# Patient Record
Sex: FEMALE | Race: BLACK OR AFRICAN AMERICAN | NOT HISPANIC OR LATINO | Employment: UNEMPLOYED | URBAN - METROPOLITAN AREA
[De-identification: names, ages, dates, MRNs, and addresses within clinical notes are randomized per-mention and may not be internally consistent; named-entity substitution may affect disease eponyms.]

---

## 2024-03-28 ENCOUNTER — HOSPITAL ENCOUNTER (OUTPATIENT)
Facility: HOSPITAL | Age: 34
Discharge: HOME | End: 2024-03-28
Attending: EMERGENCY MEDICINE | Admitting: OBSTETRICS & GYNECOLOGY
Payer: MEDICAID

## 2024-03-28 VITALS
OXYGEN SATURATION: 100 % | RESPIRATION RATE: 18 BRPM | HEART RATE: 86 BPM | HEIGHT: 64 IN | WEIGHT: 110 LBS | DIASTOLIC BLOOD PRESSURE: 60 MMHG | TEMPERATURE: 98 F | SYSTOLIC BLOOD PRESSURE: 97 MMHG | BODY MASS INDEX: 18.78 KG/M2

## 2024-03-28 DIAGNOSIS — Z3A.24 24 WEEKS GESTATION OF PREGNANCY: Primary | ICD-10-CM

## 2024-03-28 DIAGNOSIS — R10.9 ABDOMINAL PAIN: ICD-10-CM

## 2024-03-28 LAB
ABO + RH BLD: NORMAL
ALBUMIN SERPL BCP-MCNC: 3 G/DL (ref 3.5–5.2)
ALP SERPL-CCNC: 78 U/L (ref 55–135)
ALT SERPL W/O P-5'-P-CCNC: 16 U/L (ref 10–44)
ANION GAP SERPL CALC-SCNC: 6 MMOL/L (ref 8–16)
AST SERPL-CCNC: 24 U/L (ref 10–40)
B-HCG UR QL: POSITIVE
BACTERIA #/AREA URNS HPF: NORMAL /HPF
BASOPHILS # BLD AUTO: 0.01 K/UL (ref 0–0.2)
BASOPHILS NFR BLD: 0.2 % (ref 0–1.9)
BILIRUB SERPL-MCNC: 0.2 MG/DL (ref 0.1–1)
BILIRUB UR QL STRIP: NEGATIVE
BUN SERPL-MCNC: 4 MG/DL (ref 6–20)
CALCIUM SERPL-MCNC: 8.7 MG/DL (ref 8.7–10.5)
CHLORIDE SERPL-SCNC: 108 MMOL/L (ref 95–110)
CLARITY UR: CLEAR
CO2 SERPL-SCNC: 21 MMOL/L (ref 23–29)
COLOR UR: COLORLESS
CREAT SERPL-MCNC: 0.5 MG/DL (ref 0.5–1.4)
CTP QC/QA: YES
DIFFERENTIAL METHOD BLD: ABNORMAL
EOSINOPHIL # BLD AUTO: 0 K/UL (ref 0–0.5)
EOSINOPHIL NFR BLD: 0.7 % (ref 0–8)
ERYTHROCYTE [DISTWIDTH] IN BLOOD BY AUTOMATED COUNT: 13.5 % (ref 11.5–14.5)
EST. GFR  (NO RACE VARIABLE): >60 ML/MIN/1.73 M^2
GLUCOSE SERPL-MCNC: 70 MG/DL (ref 70–110)
GLUCOSE UR QL STRIP: NEGATIVE
HCG INTACT+B SERPL-ACNC: NORMAL MIU/ML
HCT VFR BLD AUTO: 32.6 % (ref 37–48.5)
HGB BLD-MCNC: 10.8 G/DL (ref 12–16)
HGB UR QL STRIP: NEGATIVE
IMM GRANULOCYTES # BLD AUTO: 0.01 K/UL (ref 0–0.04)
IMM GRANULOCYTES NFR BLD AUTO: 0.2 % (ref 0–0.5)
KETONES UR QL STRIP: NEGATIVE
LEUKOCYTE ESTERASE UR QL STRIP: ABNORMAL
LIPASE SERPL-CCNC: 11 U/L (ref 4–60)
LYMPHOCYTES # BLD AUTO: 1.5 K/UL (ref 1–4.8)
LYMPHOCYTES NFR BLD: 25.2 % (ref 18–48)
MAGNESIUM SERPL-MCNC: 1.8 MG/DL (ref 1.6–2.6)
MCH RBC QN AUTO: 30.3 PG (ref 27–31)
MCHC RBC AUTO-ENTMCNC: 33.1 G/DL (ref 32–36)
MCV RBC AUTO: 92 FL (ref 82–98)
MICROSCOPIC COMMENT: NORMAL
MONOCYTES # BLD AUTO: 0.6 K/UL (ref 0.3–1)
MONOCYTES NFR BLD: 9 % (ref 4–15)
NEUTROPHILS # BLD AUTO: 4 K/UL (ref 1.8–7.7)
NEUTROPHILS NFR BLD: 64.7 % (ref 38–73)
NITRITE UR QL STRIP: NEGATIVE
NRBC BLD-RTO: 0 /100 WBC
PH UR STRIP: 8 [PH] (ref 5–8)
PLATELET # BLD AUTO: 269 K/UL (ref 150–450)
PMV BLD AUTO: 9.7 FL (ref 9.2–12.9)
POTASSIUM SERPL-SCNC: 3.7 MMOL/L (ref 3.5–5.1)
PROT SERPL-MCNC: 7.2 G/DL (ref 6–8.4)
PROT UR QL STRIP: NEGATIVE
RBC # BLD AUTO: 3.56 M/UL (ref 4–5.4)
SODIUM SERPL-SCNC: 135 MMOL/L (ref 136–145)
SP GR UR STRIP: 1.01 (ref 1–1.03)
URN SPEC COLLECT METH UR: ABNORMAL
UROBILINOGEN UR STRIP-ACNC: NEGATIVE EU/DL
WBC # BLD AUTO: 6.1 K/UL (ref 3.9–12.7)
WBC #/AREA URNS HPF: 0 /HPF (ref 0–5)

## 2024-03-28 PROCEDURE — 99285 EMERGENCY DEPT VISIT HI MDM: CPT | Mod: 25,27

## 2024-03-28 PROCEDURE — 96360 HYDRATION IV INFUSION INIT: CPT

## 2024-03-28 PROCEDURE — 81025 URINE PREGNANCY TEST: CPT | Performed by: EMERGENCY MEDICINE

## 2024-03-28 PROCEDURE — 59025 FETAL NON-STRESS TEST: CPT

## 2024-03-28 PROCEDURE — 81000 URINALYSIS NONAUTO W/SCOPE: CPT | Performed by: EMERGENCY MEDICINE

## 2024-03-28 PROCEDURE — 83735 ASSAY OF MAGNESIUM: CPT | Performed by: EMERGENCY MEDICINE

## 2024-03-28 PROCEDURE — 99284 EMERGENCY DEPT VISIT MOD MDM: CPT | Mod: 25,27

## 2024-03-28 PROCEDURE — 85025 COMPLETE CBC W/AUTO DIFF WBC: CPT | Performed by: EMERGENCY MEDICINE

## 2024-03-28 PROCEDURE — 84702 CHORIONIC GONADOTROPIN TEST: CPT | Performed by: EMERGENCY MEDICINE

## 2024-03-28 PROCEDURE — 86901 BLOOD TYPING SEROLOGIC RH(D): CPT | Performed by: EMERGENCY MEDICINE

## 2024-03-28 PROCEDURE — 99211 OFF/OP EST MAY X REQ PHY/QHP: CPT | Mod: 25

## 2024-03-28 PROCEDURE — 83690 ASSAY OF LIPASE: CPT | Performed by: EMERGENCY MEDICINE

## 2024-03-28 PROCEDURE — 80053 COMPREHEN METABOLIC PANEL: CPT | Performed by: EMERGENCY MEDICINE

## 2024-03-28 PROCEDURE — 25000003 PHARM REV CODE 250: Performed by: EMERGENCY MEDICINE

## 2024-03-28 RX ORDER — ACETAMINOPHEN 325 MG/1
650 TABLET ORAL
Status: COMPLETED | OUTPATIENT
Start: 2024-03-28 | End: 2024-03-28

## 2024-03-28 RX ADMIN — ACETAMINOPHEN 650 MG: 325 TABLET ORAL at 12:03

## 2024-03-28 RX ADMIN — SODIUM CHLORIDE 1000 ML: 9 INJECTION, SOLUTION INTRAVENOUS at 10:03

## 2024-03-28 NOTE — ED TRIAGE NOTES
Abdominal Pain (Pt c/o abdominal cramping since last night. Pt is 20 weeks 3 days pregnant. LMP 11/06/23. Pt does feel fetal movement. No prenatal care. L&D refused patient )

## 2024-03-28 NOTE — LETTER
March 28, 2024         Karsten CESPEDES  [facility]  [place] [address]  Phone: [phone]       Patient: Franko Pierson   YOB: 1990  Date of Visit: 03/28/2024    To Whom It May Concern:    Mallory Pierson  was at [facility] on 03/28/2024. The patient may return to work/school on 3/29/24 with no restrictions. If you have any questions or concerns, or if I can be of further assistance, please do not hesitate to contact me.    Sincerely,    Funmi Ramirez RN

## 2024-03-28 NOTE — ED NOTES
"0920: Call recvd from ED stating there is an unreferred Mongolian-Creole pt who is 20w3d c/o abdominal pain "all over belly" and pt feels "a lot of movement". L&D RN asked pt if she has ever seen an OB for this pregnancy, had a dating u/s, or any kind of confirmation of pregnancy, to which the pt replied "no". L&D RN states pt is to be seen in ED for generalized abdominal pain and consult OB on call. ED used  to speak to pt during this interaction.     0924: Call recvd from ED Triage RN stating "this pt needs to go to L&D, she is 20w3d and has abdominal pain". L&D RN states that pt has not seen an OB for this pregnancy, has not had confirmation of pregnancy and has not had a dating u/s to confirm pregnancy >20w. If confirmed pregnancy over 20w, pt can come to L&D for assessment of abdominal pain. Advised ED RN to contact OB on call (Dr. Ga) to discuss pt and obtain orders.      "

## 2024-03-28 NOTE — NURSING
Pt arrived to L&D unit via wheelchair. EFMx2 placed. Full assessment done. History and medications reviewed. Pt reports active fetal movement. Denies vaginal bleeding, LOF, or any problems with urination. Abdomen soft non-tender.   Pt states she did not establish PNC due to not having health insurance. Pt states she no longer has any pain and is requesting u/s to determine gender of baby. Pt states she wants to deliver at this hospital with Dr. David. Office phone # and address given to pt. Pt states she will call to make appt. Dr. Ga notified of pt arrival to L&D and pt status. FHT reactive for 24w, no ctx on toco. Orders to d/c home and establish PNC with OB. Pt and sig other updated on POC. Discharge instructions reviewed with pt and significant other including labor precautions, kick counts, pre-eclampsia signs/symptoms and when to return to hospital. Pt verbalized understanding with good recall noted. Questions answered. Pt ambulated off unit. NAD noted.

## 2024-03-28 NOTE — ED PROVIDER NOTES
Encounter Date: 3/28/2024    SCRIBE #1 NOTE: I, Ann Lopez, am scribing for, and in the presence of,  Ana Valencia MD. I have scribed the following portions of the note - Other sections scribed: HPI, ROS, PE.       History     Chief Complaint   Patient presents with    Abdominal Pain     Pt c/o abdominal cramping since last night. Pt is 20 weeks 3 days pregnant. LMP 23. Pt does feel fetal movement. No prenatal care.      Franko Pierson is a 33 y.o. female at 20 weeks gestation , with no PMHx, who presents to the ED with diffuse Abdominal cramping,  symptoms onset since last night. Patient reports last BM was this morning but does report it was a black stool x 1.  Patient states her LMP to be . Patient also states with her last pregnancy she had pain though it is not similar to her current pain; stating that she did not have any complications with her last pregnancy. Patient further states she does not follow with an OB-GYN. No other exacerbating or alleviating factors. Denies vaginal bleeding, nausea, emesis, frequency, dysuria, hematuria, back pain, or other associated symptoms. No daily medications and no prenatal vitamins. Patient denies EtOH, tobacco, or illicit drug use. Patient reports no PSHx. NKDA.     , no complications with previous pregnancy. Does not have an Obgyn, denies prenatal care. No PNV.     She presents with her brother-in-law, independent historian, who helps her with story throughout.               The history is provided by the patient. The history is limited by a language barrier. A  was used ([id number]).     Review of patient's allergies indicates:  No Known Allergies  No past medical history on file.  No past surgical history on file.  No family history on file.     Review of Systems   Constitutional:  Negative for chills, diaphoresis and fever.   Eyes:  Negative for photophobia and visual disturbance.   Respiratory:  Negative for cough and  shortness of breath.    Cardiovascular:  Negative for chest pain and leg swelling.   Gastrointestinal:  Positive for abdominal pain. Negative for blood in stool, constipation, diarrhea, nausea and vomiting.        (+) melena     Genitourinary:  Negative for dysuria, flank pain, frequency, hematuria, urgency and vaginal bleeding.   Musculoskeletal:  Negative for back pain, neck pain and neck stiffness.   Skin:  Negative for rash and wound.   Neurological:  Negative for weakness, light-headedness, numbness and headaches.   Psychiatric/Behavioral:  Negative for confusion and suicidal ideas.    All other systems reviewed and are negative.      Physical Exam     Initial Vitals [03/28/24 0930]   BP Pulse Resp Temp SpO2   108/72 83 18 98.5 °F (36.9 °C) 100 %      MAP       --         Physical Exam    Nursing note and vitals reviewed.  Constitutional: She appears well-developed and well-nourished. She is not diaphoretic. No distress.   HENT:   Head: Normocephalic and atraumatic.   Mouth/Throat: Oropharynx is clear and moist. No oropharyngeal exudate.   Eyes: Conjunctivae and EOM are normal. Pupils are equal, round, and reactive to light. Right eye exhibits no discharge. Left eye exhibits no discharge.   Neck: Neck supple. No JVD present.   Normal range of motion.  Cardiovascular:  Normal rate, regular rhythm, normal heart sounds and intact distal pulses.     Exam reveals no gallop and no friction rub.       No murmur heard.  Pulmonary/Chest: Breath sounds normal. No respiratory distress. She has no wheezes. She has no rhonchi. She has no rales.   Abdominal: Abdomen is soft. Bowel sounds are normal. She exhibits no distension. There is no abdominal tenderness. There is no rebound and no guarding.   Genitourinary:    Genitourinary Comments: Rectal Exam chaperoned by RN nurse.     Negative rectal exam, no FOBT, no hemorrhoids.      Musculoskeletal:         General: No tenderness or edema.      Cervical back: Normal range of  motion and neck supple.     Lymphadenopathy:     She has no cervical adenopathy.   Neurological: She is alert and oriented to person, place, and time. No cranial nerve deficit.   Skin: Skin is warm and dry.   Psychiatric: She has a normal mood and affect. Thought content normal.         ED Course   Procedures  Labs Reviewed   CBC W/ AUTO DIFFERENTIAL - Abnormal; Notable for the following components:       Result Value    RBC 3.56 (*)     Hemoglobin 10.8 (*)     Hematocrit 32.6 (*)     All other components within normal limits    Narrative:     Release to patient->Immediate   COMPREHENSIVE METABOLIC PANEL - Abnormal; Notable for the following components:    Sodium 135 (*)     CO2 21 (*)     BUN 4 (*)     Albumin 3.0 (*)     Anion Gap 6 (*)     All other components within normal limits    Narrative:     Release to patient->Immediate   URINALYSIS, REFLEX TO URINE CULTURE - Abnormal; Notable for the following components:    Color, UA Colorless (*)     Leukocytes, UA 2+ (*)     All other components within normal limits    Narrative:     Specimen Source->Urine   POCT URINE PREGNANCY - Abnormal; Notable for the following components:    POC Preg Test, Ur Positive (*)     All other components within normal limits   HCG, QUANTITATIVE    Narrative:     Release to patient->Immediate   LIPASE    Narrative:     Release to patient->Immediate   MAGNESIUM    Narrative:     Release to patient->Immediate   URINALYSIS MICROSCOPIC    Narrative:     Specimen Source->Urine   GROUP & RH          Imaging Results              US OB Limited 1 Or More Gestations (Final result)  Result time 03/28/24 13:42:09   Procedure changed from US OB <14 Wks, TransAbd, Single Gestation     Final result by Ivan Lehman MD (03/28/24 13:42:09)                   Impression:      Single, viable, intrauterine gestation with a combine biometric derived estimated gestational age of 24 weeks 1 day and an CHRIS of 07/17/2024.    Amniotic fluid volume is within  normal limits and there is no evidence of placenta previa.    Two uterine fibroids are identified with the larger measuring 5.1 cm in greatest dimension.      Electronically signed by: Ivan Lehman MD  Date:    03/28/2024  Time:    13:42               Narrative:    EXAMINATION:  US OB LIMITED 1 OR MORE GESTATIONS    CLINICAL HISTORY:  abd pain;  Unspecified abdominal pain    TECHNIQUE:  Transabdominal 2nd or 3rd trimester obstetrical ultrasound was performed.    COMPARISON:  None.    FINDINGS:  There is a single viable intrauterine gestation currently in breech presentation with a combined biometric derived estimated gestational age of 24 weeks 1 day +/- 1 week 5 days and an CHRIS of 07/17/2024.  Estimated fetal weight is 649 g +/- 117 g (1 lb 7 oz +/- 4 oz).  The individual biometric parameters are as follows:    BPD: 6.04 cm    Head circumference: 21.96 cm    Abdominal circumference: 19.20 cm    Amniotic fluid volume is within normal limits with an AINSLEY of 148 mm.  Fetal cardiac activity is seen with a rate of 151 beats per minute on M-mode imaging.    The cervix appears closed and there is no evidence placenta previa.  The placenta is anterior-fundal in location.  The cervix is closed and elongated.  Limited, fetal anatomic survey including ventricular atria, posterior fossa, three-vessel cord, 4 chamber heart view, stomach, cord insert, 3 vessel cord, bladder, spine, and 4 limbs demonstrate no gross abnormalities.  The adnexal regions and uterus are otherwise unremarkable.    There are uterine fibroids present including a fibroid measuring 4.5 x 4.2 x 5.1 cm and a fibroid measuring 3.8 x 3.9 x 4.0 cm.                                       Medications   sodium chloride 0.9% bolus 1,000 mL 1,000 mL (0 mLs Intravenous Stopped 3/28/24 1150)   acetaminophen tablet 650 mg (650 mg Oral Given 3/28/24 1213)     Medical Decision Making  Amount and/or Complexity of Data Reviewed  Labs: ordered.  Radiology:  ordered.    Risk  OTC drugs.    MDM  Patient presents with pregnancy and abdominal pain with beta 22K and viable pregnancy 24 W 1 D on u/s. There is no vaginal bleeding.    DDX included but not limited to: normal pregnancy, miscarriage, AUB, UTI, ectopic, PID    No fever, no RLQ pain, no vomiting. I have lower suspicion for appendicitis, intraabdominal infection, SBO, etc.     Unremarkable labs, negative urinalysis for UTI/protein/glucose  At this time, this is likely abdominal pain of pregnancy and there are no findings on exam/lab to suspect acute process. No unilateral pain/adnexal TTP and normal u/s and labs.     Mild anemia to 10.8. No leukocytosis. CO2 mildly low at 21. Na 135. O positive.     Case discussed with Dr Ga and will send patient to L&D for monitoring given abdominal pain in pregnancy with > 20 weeks, she reports will do a vaginal exam there so no need for patient to have two. Will defer vaginal exam to OBGYN. .             Scribe Attestation:   Scribe #1: I performed the above scribed service and the documentation accurately describes the services I performed. I attest to the accuracy of the note.           I, Ana Valencia, personally performed the services described in this documentation. All medical record entries made by the scribe were at my direction and in my presence.  I have reviewed the chart and agree that the record reflects my personal performance and is accurate and complete.                      Clinical Impression:  Final diagnoses:  [R10.9] Abdominal pain  [Z3A.24] 24 weeks gestation of pregnancy (Primary)          ED Disposition Condition    Observation Stable                Ana Valencia MD  03/28/24 7693

## 2024-04-22 ENCOUNTER — INITIAL PRENATAL (OUTPATIENT)
Dept: OBSTETRICS AND GYNECOLOGY | Facility: CLINIC | Age: 34
End: 2024-04-22
Payer: MEDICAID

## 2024-04-22 DIAGNOSIS — O09.523 AMA (ADVANCED MATERNAL AGE) MULTIGRAVIDA 35+, THIRD TRIMESTER: ICD-10-CM

## 2024-04-22 DIAGNOSIS — Z3A.27 27 WEEKS GESTATION OF PREGNANCY: ICD-10-CM

## 2024-04-22 DIAGNOSIS — O09.33 LATE PRENATAL CARE AFFECTING PREGNANCY IN THIRD TRIMESTER: Primary | ICD-10-CM

## 2024-04-22 PROCEDURE — 87086 URINE CULTURE/COLONY COUNT: CPT | Performed by: OBSTETRICS & GYNECOLOGY

## 2024-04-22 PROCEDURE — 99204 OFFICE O/P NEW MOD 45 MIN: CPT | Mod: TH,S$PBB,, | Performed by: OBSTETRICS & GYNECOLOGY

## 2024-04-22 PROCEDURE — 87591 N.GONORRHOEAE DNA AMP PROB: CPT | Performed by: OBSTETRICS & GYNECOLOGY

## 2024-04-22 PROCEDURE — 87491 CHLMYD TRACH DNA AMP PROBE: CPT | Performed by: OBSTETRICS & GYNECOLOGY

## 2024-04-22 NOTE — PROGRESS NOTES
33 yr  @ 27 wk 5 d dated by 24 wk sono presents to begin PNC. Prior delivery , uncomplicated in [place].  PMH and PSH neg.    Assessment/Plan  1. Late prenatal care affecting pregnancy in third trimester    2. 27 weeks gestation of pregnancy    3. AMA (advanced maternal age) multigravida 35+, third trimester      PN I/II labs this week  Anatomy scan ordered  Pt most likely desires future fertility

## 2024-04-23 ENCOUNTER — TELEPHONE (OUTPATIENT)
Dept: MATERNAL FETAL MEDICINE | Facility: CLINIC | Age: 34
End: 2024-04-23
Payer: MEDICAID

## 2024-04-23 LAB
C TRACH DNA SPEC QL NAA+PROBE: NOT DETECTED
N GONORRHOEA DNA SPEC QL NAA+PROBE: NOT DETECTED

## 2024-04-24 LAB — BACTERIA UR CULT: NORMAL

## 2024-04-25 ENCOUNTER — LAB VISIT (OUTPATIENT)
Dept: LAB | Facility: HOSPITAL | Age: 34
End: 2024-04-25
Attending: OBSTETRICS & GYNECOLOGY
Payer: MEDICAID

## 2024-04-25 DIAGNOSIS — O09.33 LATE PRENATAL CARE AFFECTING PREGNANCY IN THIRD TRIMESTER: ICD-10-CM

## 2024-04-25 DIAGNOSIS — Z3A.27 27 WEEKS GESTATION OF PREGNANCY: ICD-10-CM

## 2024-04-25 DIAGNOSIS — O09.523 AMA (ADVANCED MATERNAL AGE) MULTIGRAVIDA 35+, THIRD TRIMESTER: ICD-10-CM

## 2024-04-25 LAB
ABO + RH BLD: NORMAL
BASOPHILS # BLD AUTO: 0.01 K/UL (ref 0–0.2)
BASOPHILS NFR BLD: 0.2 % (ref 0–1.9)
BLD GP AB SCN CELLS X3 SERPL QL: NORMAL
DIFFERENTIAL METHOD BLD: ABNORMAL
EOSINOPHIL # BLD AUTO: 0.1 K/UL (ref 0–0.5)
EOSINOPHIL NFR BLD: 0.9 % (ref 0–8)
ERYTHROCYTE [DISTWIDTH] IN BLOOD BY AUTOMATED COUNT: 13.5 % (ref 11.5–14.5)
ESTIMATED AVG GLUCOSE: 103 MG/DL (ref 68–131)
GLUCOSE SERPL-MCNC: 106 MG/DL (ref 70–140)
HBA1C MFR BLD: 5.2 % (ref 4–5.6)
HBV SURFACE AG SERPL QL IA: NORMAL
HCT VFR BLD AUTO: 32 % (ref 37–48.5)
HCV AB SERPL QL IA: NORMAL
HGB BLD-MCNC: 10.3 G/DL (ref 12–16)
HGB S BLD QL SOLY: NEGATIVE
HIV 1+2 AB+HIV1 P24 AG SERPL QL IA: NORMAL
IMM GRANULOCYTES # BLD AUTO: 0.02 K/UL (ref 0–0.04)
IMM GRANULOCYTES NFR BLD AUTO: 0.4 % (ref 0–0.5)
LYMPHOCYTES # BLD AUTO: 1.2 K/UL (ref 1–4.8)
LYMPHOCYTES NFR BLD: 22.5 % (ref 18–48)
MCH RBC QN AUTO: 30.2 PG (ref 27–31)
MCHC RBC AUTO-ENTMCNC: 32.2 G/DL (ref 32–36)
MCV RBC AUTO: 94 FL (ref 82–98)
MONOCYTES # BLD AUTO: 0.6 K/UL (ref 0.3–1)
MONOCYTES NFR BLD: 10.2 % (ref 4–15)
NEUTROPHILS # BLD AUTO: 3.6 K/UL (ref 1.8–7.7)
NEUTROPHILS NFR BLD: 65.8 % (ref 38–73)
NRBC BLD-RTO: 0 /100 WBC
PLATELET # BLD AUTO: 257 K/UL (ref 150–450)
PMV BLD AUTO: 9.2 FL (ref 9.2–12.9)
RBC # BLD AUTO: 3.41 M/UL (ref 4–5.4)
SPECIMEN OUTDATE: NORMAL
TREPONEMA PALLIDUM IGG+IGM AB [PRESENCE] IN SERUM OR PLASMA BY IMMUNOASSAY: NONREACTIVE
WBC # BLD AUTO: 5.5 K/UL (ref 3.9–12.7)

## 2024-04-25 PROCEDURE — 86762 RUBELLA ANTIBODY: CPT | Performed by: OBSTETRICS & GYNECOLOGY

## 2024-04-25 PROCEDURE — 86593 SYPHILIS TEST NON-TREP QUANT: CPT | Performed by: OBSTETRICS & GYNECOLOGY

## 2024-04-25 PROCEDURE — 85025 COMPLETE CBC W/AUTO DIFF WBC: CPT | Performed by: OBSTETRICS & GYNECOLOGY

## 2024-04-25 PROCEDURE — 85660 RBC SICKLE CELL TEST: CPT | Performed by: OBSTETRICS & GYNECOLOGY

## 2024-04-25 PROCEDURE — 82950 GLUCOSE TEST: CPT | Performed by: OBSTETRICS & GYNECOLOGY

## 2024-04-25 PROCEDURE — 86803 HEPATITIS C AB TEST: CPT | Performed by: OBSTETRICS & GYNECOLOGY

## 2024-04-25 PROCEDURE — 86850 RBC ANTIBODY SCREEN: CPT | Performed by: OBSTETRICS & GYNECOLOGY

## 2024-04-25 PROCEDURE — 87340 HEPATITIS B SURFACE AG IA: CPT | Performed by: OBSTETRICS & GYNECOLOGY

## 2024-04-25 PROCEDURE — 87389 HIV-1 AG W/HIV-1&-2 AB AG IA: CPT | Performed by: OBSTETRICS & GYNECOLOGY

## 2024-04-25 PROCEDURE — 83036 HEMOGLOBIN GLYCOSYLATED A1C: CPT | Performed by: OBSTETRICS & GYNECOLOGY

## 2024-04-25 PROCEDURE — 36415 COLL VENOUS BLD VENIPUNCTURE: CPT | Performed by: OBSTETRICS & GYNECOLOGY

## 2024-04-26 LAB
RUBV IGG SER-ACNC: 58.6 IU/ML
RUBV IGG SER-IMP: REACTIVE

## 2024-05-06 ENCOUNTER — PROCEDURE VISIT (OUTPATIENT)
Dept: MATERNAL FETAL MEDICINE | Facility: CLINIC | Age: 34
End: 2024-05-06
Payer: MEDICAID

## 2024-05-06 ENCOUNTER — ROUTINE PRENATAL (OUTPATIENT)
Dept: OBSTETRICS AND GYNECOLOGY | Facility: CLINIC | Age: 34
End: 2024-05-06
Payer: MEDICAID

## 2024-05-06 VITALS — WEIGHT: 113.88 LBS | BODY MASS INDEX: 19.55 KG/M2

## 2024-05-06 DIAGNOSIS — O09.33 LATE PRENATAL CARE AFFECTING PREGNANCY IN THIRD TRIMESTER: Primary | ICD-10-CM

## 2024-05-06 DIAGNOSIS — O09.33 LATE PRENATAL CARE AFFECTING PREGNANCY IN THIRD TRIMESTER: ICD-10-CM

## 2024-05-06 DIAGNOSIS — O09.523 AMA (ADVANCED MATERNAL AGE) MULTIGRAVIDA 35+, THIRD TRIMESTER: ICD-10-CM

## 2024-05-06 DIAGNOSIS — Z3A.29 PREGNANCY WITH 29 COMPLETED WEEKS GESTATION: ICD-10-CM

## 2024-05-06 DIAGNOSIS — Z3A.27 27 WEEKS GESTATION OF PREGNANCY: ICD-10-CM

## 2024-05-06 DIAGNOSIS — Z36.2 ENCOUNTER FOR FOLLOW-UP ULTRASOUND OF FETAL ANATOMY: Primary | ICD-10-CM

## 2024-05-06 PROCEDURE — 99213 OFFICE O/P EST LOW 20 MIN: CPT | Mod: TH,S$PBB,, | Performed by: OBSTETRICS & GYNECOLOGY

## 2024-05-06 PROCEDURE — 99999 PR PBB SHADOW E&M-EST. PATIENT-LVL I: CPT | Mod: PBBFAC,,, | Performed by: OBSTETRICS & GYNECOLOGY

## 2024-05-06 PROCEDURE — 99211 OFF/OP EST MAY X REQ PHY/QHP: CPT | Mod: PBBFAC,TH,25 | Performed by: OBSTETRICS & GYNECOLOGY

## 2024-05-06 PROCEDURE — 76811 OB US DETAILED SNGL FETUS: CPT | Mod: PBBFAC | Performed by: OBSTETRICS & GYNECOLOGY

## 2024-05-06 NOTE — PROGRESS NOTES
Pt doing well, discussed normal PN labs; pt denies any c/o today    Assessment/Plan  1. Late prenatal care affecting pregnancy in third trimester    2. Pregnancy with 29 completed weeks gestation      20 minutes spend in total time reviewing labs, prior sonos and previous visits

## 2024-06-04 ENCOUNTER — PROCEDURE VISIT (OUTPATIENT)
Dept: MATERNAL FETAL MEDICINE | Facility: CLINIC | Age: 34
End: 2024-06-04
Payer: MEDICAID

## 2024-06-04 ENCOUNTER — ROUTINE PRENATAL (OUTPATIENT)
Dept: OBSTETRICS AND GYNECOLOGY | Facility: CLINIC | Age: 34
End: 2024-06-04
Payer: MEDICAID

## 2024-06-04 VITALS — WEIGHT: 120.56 LBS | BODY MASS INDEX: 20.7 KG/M2 | SYSTOLIC BLOOD PRESSURE: 98 MMHG | DIASTOLIC BLOOD PRESSURE: 62 MMHG

## 2024-06-04 DIAGNOSIS — O09.523 AMA (ADVANCED MATERNAL AGE) MULTIGRAVIDA 35+, THIRD TRIMESTER: ICD-10-CM

## 2024-06-04 DIAGNOSIS — Z23 NEED FOR TDAP VACCINATION: ICD-10-CM

## 2024-06-04 DIAGNOSIS — Z36.2 ENCOUNTER FOR FOLLOW-UP ULTRASOUND OF FETAL ANATOMY: ICD-10-CM

## 2024-06-04 DIAGNOSIS — O09.33 LATE PRENATAL CARE AFFECTING PREGNANCY IN THIRD TRIMESTER: Primary | ICD-10-CM

## 2024-06-04 DIAGNOSIS — Z3A.33 33 WEEKS GESTATION OF PREGNANCY: ICD-10-CM

## 2024-06-04 PROCEDURE — 90715 TDAP VACCINE 7 YRS/> IM: CPT | Mod: PBBFAC

## 2024-06-04 PROCEDURE — 90471 IMMUNIZATION ADMIN: CPT | Mod: PBBFAC

## 2024-06-04 PROCEDURE — 99999PBSHW TDAP VACCINE GREATER THAN OR EQUAL TO 7YO IM: Mod: PBBFAC,,,

## 2024-06-04 PROCEDURE — 99999 PR PBB SHADOW E&M-EST. PATIENT-LVL II: CPT | Mod: PBBFAC,,, | Performed by: OBSTETRICS & GYNECOLOGY

## 2024-06-04 PROCEDURE — 99213 OFFICE O/P EST LOW 20 MIN: CPT | Mod: TH,S$PBB,, | Performed by: OBSTETRICS & GYNECOLOGY

## 2024-06-04 PROCEDURE — 99212 OFFICE O/P EST SF 10 MIN: CPT | Mod: PBBFAC,TH | Performed by: OBSTETRICS & GYNECOLOGY

## 2024-06-04 PROCEDURE — 76816 OB US FOLLOW-UP PER FETUS: CPT | Mod: PBBFAC | Performed by: OBSTETRICS & GYNECOLOGY

## 2024-06-04 NOTE — PROGRESS NOTES
Discussed need for tdap today, discussed f/u in 2 weeks; pt denies any c/o and feels well.    Assessment/Plan  1. Late prenatal care affecting pregnancy in third trimester    2. 33 weeks gestation of pregnancy    3. AMA (advanced maternal age) multigravida 35+, third trimester      20 minutes spend in total time reviewing labs, prior sonos and previous visits

## 2024-06-18 ENCOUNTER — ROUTINE PRENATAL (OUTPATIENT)
Dept: OBSTETRICS AND GYNECOLOGY | Facility: CLINIC | Age: 34
End: 2024-06-18
Payer: MEDICAID

## 2024-06-18 VITALS
DIASTOLIC BLOOD PRESSURE: 64 MMHG | SYSTOLIC BLOOD PRESSURE: 100 MMHG | WEIGHT: 121.25 LBS | BODY MASS INDEX: 20.81 KG/M2

## 2024-06-18 DIAGNOSIS — O09.33 LATE PRENATAL CARE AFFECTING PREGNANCY IN THIRD TRIMESTER: Primary | ICD-10-CM

## 2024-06-18 DIAGNOSIS — O09.523 AMA (ADVANCED MATERNAL AGE) MULTIGRAVIDA 35+, THIRD TRIMESTER: ICD-10-CM

## 2024-06-18 DIAGNOSIS — Z3A.35 35 WEEKS GESTATION OF PREGNANCY: ICD-10-CM

## 2024-06-18 PROCEDURE — 87653 STREP B DNA AMP PROBE: CPT | Performed by: OBSTETRICS & GYNECOLOGY

## 2024-06-18 PROCEDURE — 99999 PR PBB SHADOW E&M-EST. PATIENT-LVL II: CPT | Mod: PBBFAC,,, | Performed by: OBSTETRICS & GYNECOLOGY

## 2024-06-18 PROCEDURE — 99212 OFFICE O/P EST SF 10 MIN: CPT | Mod: PBBFAC,TH | Performed by: OBSTETRICS & GYNECOLOGY

## 2024-06-18 PROCEDURE — 99213 OFFICE O/P EST LOW 20 MIN: CPT | Mod: TH,S$PBB,, | Performed by: OBSTETRICS & GYNECOLOGY

## 2024-06-18 NOTE — LETTER
June 18, 2024    Franko Delaney  [address] [place] [address]             [facility]  [address]  [place] [address]  Phone: [phone] To Whom It May Concern,        It is my medical opinion Mrs. Delaney needs to stop working on June 25,2024.      If you have any questions or concerns, please don't hesitate to call.    Sincerely,        Josué David MD

## 2024-06-18 NOTE — PROGRESS NOTES
Pt requesting leave from work on 6/26/24.  Note given, pt needs to bring papers from [place].  Pt denies any other c/o.  GBS collected.  Labor precautions given.  AMN interpretor used.    Assessment/Plan  1. Late prenatal care affecting pregnancy in third trimester    2. 35 weeks gestation of pregnancy    3. AMA (advanced maternal age) multigravida 35+, third trimester      20 minutes spend in total time reviewing labs, prior sonos and previous visits

## 2024-06-18 NOTE — LETTER
June 18, 2024    Franko Pierson  [address] [place] [address]         [facility]  [address]  [address]  [place] [address]  Phone: [phone] June 18, 2024     Patient: Franko Pierson   YOB: 1990   Date of Visit: 6/18/2024       To Whom It May Concern:    It is my medical opinion that Franko Pierson may  stop working on 06/25/2024. She may return to work 6 weeks after the birth of her child.     If you have any questions or concerns, please don't hesitate to call.    Sincerely,        Josué David MD

## 2024-06-20 LAB — GROUP B STREPTOCOCCUS, PCR: POSITIVE

## 2024-06-25 ENCOUNTER — ROUTINE PRENATAL (OUTPATIENT)
Dept: OBSTETRICS AND GYNECOLOGY | Facility: CLINIC | Age: 34
End: 2024-06-25
Payer: MEDICAID

## 2024-06-25 VITALS
WEIGHT: 123.44 LBS | BODY MASS INDEX: 21.19 KG/M2 | DIASTOLIC BLOOD PRESSURE: 84 MMHG | SYSTOLIC BLOOD PRESSURE: 120 MMHG

## 2024-06-25 DIAGNOSIS — Z3A.35 35 WEEKS GESTATION OF PREGNANCY: ICD-10-CM

## 2024-06-25 DIAGNOSIS — O09.33 LATE PRENATAL CARE AFFECTING PREGNANCY IN THIRD TRIMESTER: Primary | ICD-10-CM

## 2024-06-25 PROCEDURE — 99999 PR PBB SHADOW E&M-EST. PATIENT-LVL II: CPT | Mod: PBBFAC,,, | Performed by: OBSTETRICS & GYNECOLOGY

## 2024-06-25 PROCEDURE — 99212 OFFICE O/P EST SF 10 MIN: CPT | Mod: PBBFAC,TH | Performed by: OBSTETRICS & GYNECOLOGY

## 2024-06-25 NOTE — PROGRESS NOTES
Pt doing well, denies any c/o; denies ctx's; labor precautions.    Assessment/Plan  1. Late prenatal care affecting pregnancy in third trimester    2. 35 weeks gestation of pregnancy      20 minutes spend in total time reviewing labs, prior sonos and previous visits

## 2024-07-02 ENCOUNTER — ROUTINE PRENATAL (OUTPATIENT)
Dept: OBSTETRICS AND GYNECOLOGY | Facility: CLINIC | Age: 34
End: 2024-07-02
Payer: MEDICAID

## 2024-07-02 VITALS
SYSTOLIC BLOOD PRESSURE: 103 MMHG | BODY MASS INDEX: 20.28 KG/M2 | WEIGHT: 118.19 LBS | DIASTOLIC BLOOD PRESSURE: 68 MMHG

## 2024-07-02 DIAGNOSIS — O09.33 LATE PRENATAL CARE AFFECTING PREGNANCY IN THIRD TRIMESTER: Primary | ICD-10-CM

## 2024-07-02 DIAGNOSIS — Z3A.37 PREGNANCY WITH 37 WEEKS COMPLETED GESTATION: ICD-10-CM

## 2024-07-02 PROCEDURE — 99459 PELVIC EXAMINATION: CPT | Mod: PBBFAC | Performed by: OBSTETRICS & GYNECOLOGY

## 2024-07-02 PROCEDURE — 99999 PR PBB SHADOW E&M-EST. PATIENT-LVL II: CPT | Mod: PBBFAC,,, | Performed by: OBSTETRICS & GYNECOLOGY

## 2024-07-02 PROCEDURE — 99212 OFFICE O/P EST SF 10 MIN: CPT | Mod: PBBFAC,TH | Performed by: OBSTETRICS & GYNECOLOGY

## 2024-07-02 NOTE — PROGRESS NOTES
Pt requesting to be put out of work as of now because she is too uncomfortable.  Pt admits some ctx's, good FM, no LOF, no bleeding.  AMN interpretor used.    Female chaperone, Elieser Sheridan,  present for entire exam      Assessment/Plan  1. Late prenatal care affecting pregnancy in third trimester    2. Pregnancy with 37 weeks completed gestation      20 minutes spend in total time reviewing labs, prior sonos and previous visits

## 2024-07-09 ENCOUNTER — HOSPITAL ENCOUNTER (INPATIENT)
Facility: HOSPITAL | Age: 34
LOS: 2 days | Discharge: HOME | End: 2024-07-11
Attending: OBSTETRICS & GYNECOLOGY | Admitting: OBSTETRICS & GYNECOLOGY
Payer: MEDICAID

## 2024-07-09 DIAGNOSIS — R10.9 ABDOMINAL PAIN AFFECTING PREGNANCY: ICD-10-CM

## 2024-07-09 DIAGNOSIS — O26.899 ABDOMINAL PAIN AFFECTING PREGNANCY: ICD-10-CM

## 2024-07-09 PROBLEM — R52 PAIN DURING LABOR: Status: ACTIVE | Noted: 2024-07-09

## 2024-07-09 PROBLEM — Z3A.38 38 WEEKS GESTATION OF PREGNANCY: Status: ACTIVE | Noted: 2024-07-09

## 2024-07-09 LAB
ABO + RH BLD: NORMAL
BASOPHILS # BLD AUTO: 0.01 K/UL (ref 0–0.2)
BASOPHILS NFR BLD: 0.2 % (ref 0–1.9)
BILIRUB UR QL STRIP: NEGATIVE
BLD GP AB SCN CELLS X3 SERPL QL: NORMAL
CLARITY UR: CLEAR
COLOR UR: YELLOW
DIFFERENTIAL METHOD BLD: ABNORMAL
EOSINOPHIL # BLD AUTO: 0 K/UL (ref 0–0.5)
EOSINOPHIL NFR BLD: 0.6 % (ref 0–8)
ERYTHROCYTE [DISTWIDTH] IN BLOOD BY AUTOMATED COUNT: 13.8 % (ref 11.5–14.5)
GLUCOSE UR QL STRIP: NEGATIVE
HCT VFR BLD AUTO: 34.2 % (ref 37–48.5)
HGB BLD-MCNC: 10.9 G/DL (ref 12–16)
HGB UR QL STRIP: NEGATIVE
IMM GRANULOCYTES # BLD AUTO: 0.04 K/UL (ref 0–0.04)
IMM GRANULOCYTES NFR BLD AUTO: 0.6 % (ref 0–0.5)
KETONES UR QL STRIP: NEGATIVE
LEUKOCYTE ESTERASE UR QL STRIP: NEGATIVE
LYMPHOCYTES # BLD AUTO: 1.7 K/UL (ref 1–4.8)
LYMPHOCYTES NFR BLD: 25.5 % (ref 18–48)
MCH RBC QN AUTO: 27.3 PG (ref 27–31)
MCHC RBC AUTO-ENTMCNC: 31.9 G/DL (ref 32–36)
MCV RBC AUTO: 86 FL (ref 82–98)
MONOCYTES # BLD AUTO: 0.9 K/UL (ref 0.3–1)
MONOCYTES NFR BLD: 13.5 % (ref 4–15)
NEUTROPHILS # BLD AUTO: 3.9 K/UL (ref 1.8–7.7)
NEUTROPHILS NFR BLD: 59.6 % (ref 38–73)
NITRITE UR QL STRIP: NEGATIVE
NRBC BLD-RTO: 0 /100 WBC
PH UR STRIP: 8 [PH] (ref 5–8)
PLATELET # BLD AUTO: 310 K/UL (ref 150–450)
PMV BLD AUTO: 10.6 FL (ref 9.2–12.9)
PROT UR QL STRIP: NEGATIVE
RBC # BLD AUTO: 4 M/UL (ref 4–5.4)
SP GR UR STRIP: 1.01 (ref 1–1.03)
URN SPEC COLLECT METH UR: NORMAL
UROBILINOGEN UR STRIP-ACNC: NEGATIVE EU/DL
WBC # BLD AUTO: 6.52 K/UL (ref 3.9–12.7)

## 2024-07-09 PROCEDURE — 85025 COMPLETE CBC W/AUTO DIFF WBC: CPT | Performed by: OBSTETRICS & GYNECOLOGY

## 2024-07-09 PROCEDURE — 63600175 PHARM REV CODE 636 W HCPCS: Performed by: OBSTETRICS & GYNECOLOGY

## 2024-07-09 PROCEDURE — 81003 URINALYSIS AUTO W/O SCOPE: CPT | Performed by: OBSTETRICS & GYNECOLOGY

## 2024-07-09 PROCEDURE — 10907ZC DRAINAGE OF AMNIOTIC FLUID, THERAPEUTIC FROM PRODUCTS OF CONCEPTION, VIA NATURAL OR ARTIFICIAL OPENING: ICD-10-PCS | Performed by: OBSTETRICS & GYNECOLOGY

## 2024-07-09 PROCEDURE — 25000003 PHARM REV CODE 250: Performed by: OBSTETRICS & GYNECOLOGY

## 2024-07-09 PROCEDURE — 86900 BLOOD TYPING SEROLOGIC ABO: CPT | Performed by: OBSTETRICS & GYNECOLOGY

## 2024-07-09 PROCEDURE — 99211 OFF/OP EST MAY X REQ PHY/QHP: CPT | Mod: 25,TH

## 2024-07-09 PROCEDURE — 11000001 HC ACUTE MED/SURG PRIVATE ROOM

## 2024-07-09 PROCEDURE — 63600175 PHARM REV CODE 636 W HCPCS

## 2024-07-09 PROCEDURE — 86593 SYPHILIS TEST NON-TREP QUANT: CPT | Performed by: OBSTETRICS & GYNECOLOGY

## 2024-07-09 PROCEDURE — 72200004 HC VAGINAL DELIVERY LEVEL I

## 2024-07-09 PROCEDURE — 59025 FETAL NON-STRESS TEST: CPT | Mod: 26,,, | Performed by: OBSTETRICS & GYNECOLOGY

## 2024-07-09 PROCEDURE — 72100002 HC LABOR CARE, 1ST 8 HOURS

## 2024-07-09 PROCEDURE — 59025 FETAL NON-STRESS TEST: CPT

## 2024-07-09 RX ORDER — PRENATAL WITH FERROUS FUM AND FOLIC ACID 3080; 920; 120; 400; 22; 1.84; 3; 20; 10; 1; 12; 200; 27; 25; 2 [IU]/1; [IU]/1; MG/1; [IU]/1; MG/1; MG/1; MG/1; MG/1; MG/1; MG/1; UG/1; MG/1; MG/1; MG/1; MG/1
1 TABLET ORAL DAILY
Status: DISCONTINUED | OUTPATIENT
Start: 2024-07-09 | End: 2024-07-11 | Stop reason: HOSPADM

## 2024-07-09 RX ORDER — ACETAMINOPHEN 325 MG/1
650 TABLET ORAL EVERY 6 HOURS SCHEDULED
Status: DISCONTINUED | OUTPATIENT
Start: 2024-07-09 | End: 2024-07-11 | Stop reason: HOSPADM

## 2024-07-09 RX ORDER — OXYTOCIN-SODIUM CHLORIDE 0.9% IV SOLN 30 UNIT/500ML 30-0.9/5 UT/ML-%
95 SOLUTION INTRAVENOUS ONCE
Status: COMPLETED | OUTPATIENT
Start: 2024-07-09 | End: 2024-07-09

## 2024-07-09 RX ORDER — SODIUM CHLORIDE, SODIUM LACTATE, POTASSIUM CHLORIDE, CALCIUM CHLORIDE 600; 310; 30; 20 MG/100ML; MG/100ML; MG/100ML; MG/100ML
INJECTION, SOLUTION INTRAVENOUS CONTINUOUS
Status: DISCONTINUED | OUTPATIENT
Start: 2024-07-09 | End: 2024-07-09

## 2024-07-09 RX ORDER — OXYTOCIN-SODIUM CHLORIDE 0.9% IV SOLN 30 UNIT/500ML 30-0.9/5 UT/ML-%
2 SOLUTION INTRAVENOUS CONTINUOUS
Status: DISCONTINUED | OUTPATIENT
Start: 2024-07-09 | End: 2024-07-09

## 2024-07-09 RX ORDER — TRANEXAMIC ACID 10 MG/ML
1000 INJECTION, SOLUTION INTRAVENOUS EVERY 30 MIN PRN
Status: DISCONTINUED | OUTPATIENT
Start: 2024-07-09 | End: 2024-07-11 | Stop reason: HOSPADM

## 2024-07-09 RX ORDER — MISOPROSTOL 200 UG/1
800 TABLET ORAL ONCE AS NEEDED
Status: DISCONTINUED | OUTPATIENT
Start: 2024-07-09 | End: 2024-07-11 | Stop reason: HOSPADM

## 2024-07-09 RX ORDER — CARBOPROST TROMETHAMINE 250 UG/ML
250 INJECTION, SOLUTION INTRAMUSCULAR
Status: DISCONTINUED | OUTPATIENT
Start: 2024-07-09 | End: 2024-07-11 | Stop reason: HOSPADM

## 2024-07-09 RX ORDER — SODIUM CHLORIDE 0.9 % (FLUSH) 0.9 %
10 SYRINGE (ML) INJECTION
Status: DISCONTINUED | OUTPATIENT
Start: 2024-07-09 | End: 2024-07-11 | Stop reason: HOSPADM

## 2024-07-09 RX ORDER — DIPHENOXYLATE HYDROCHLORIDE AND ATROPINE SULFATE 2.5; .025 MG/1; MG/1
2 TABLET ORAL EVERY 6 HOURS PRN
Status: DISCONTINUED | OUTPATIENT
Start: 2024-07-09 | End: 2024-07-11 | Stop reason: HOSPADM

## 2024-07-09 RX ORDER — ONDANSETRON 8 MG/1
8 TABLET, ORALLY DISINTEGRATING ORAL EVERY 8 HOURS PRN
Status: DISCONTINUED | OUTPATIENT
Start: 2024-07-09 | End: 2024-07-11 | Stop reason: HOSPADM

## 2024-07-09 RX ORDER — IBUPROFEN 400 MG/1
800 TABLET ORAL EVERY 8 HOURS
Status: DISCONTINUED | OUTPATIENT
Start: 2024-07-09 | End: 2024-07-11 | Stop reason: HOSPADM

## 2024-07-09 RX ORDER — ACETAMINOPHEN 500 MG
500 TABLET ORAL EVERY 6 HOURS PRN
Status: DISCONTINUED | OUTPATIENT
Start: 2024-07-09 | End: 2024-07-09

## 2024-07-09 RX ORDER — HYDROCODONE BITARTRATE AND ACETAMINOPHEN 5; 325 MG/1; MG/1
1 TABLET ORAL EVERY 4 HOURS PRN
Status: DISCONTINUED | OUTPATIENT
Start: 2024-07-09 | End: 2024-07-11 | Stop reason: HOSPADM

## 2024-07-09 RX ORDER — HYDROCORTISONE 25 MG/G
CREAM TOPICAL 3 TIMES DAILY PRN
Status: DISCONTINUED | OUTPATIENT
Start: 2024-07-09 | End: 2024-07-11 | Stop reason: HOSPADM

## 2024-07-09 RX ORDER — OXYTOCIN 10 [USP'U]/ML
10 INJECTION, SOLUTION INTRAMUSCULAR; INTRAVENOUS ONCE AS NEEDED
Status: DISCONTINUED | OUTPATIENT
Start: 2024-07-09 | End: 2024-07-11 | Stop reason: HOSPADM

## 2024-07-09 RX ORDER — ONDANSETRON 8 MG/1
8 TABLET, ORALLY DISINTEGRATING ORAL EVERY 8 HOURS PRN
Status: DISCONTINUED | OUTPATIENT
Start: 2024-07-09 | End: 2024-07-09

## 2024-07-09 RX ORDER — OXYTOCIN-SODIUM CHLORIDE 0.9% IV SOLN 30 UNIT/500ML 30-0.9/5 UT/ML-%
10 SOLUTION INTRAVENOUS ONCE AS NEEDED
Status: COMPLETED | OUTPATIENT
Start: 2024-07-09 | End: 2024-07-09

## 2024-07-09 RX ORDER — OXYTOCIN-SODIUM CHLORIDE 0.9% IV SOLN 30 UNIT/500ML 30-0.9/5 UT/ML-%
95 SOLUTION INTRAVENOUS ONCE AS NEEDED
Status: DISCONTINUED | OUTPATIENT
Start: 2024-07-09 | End: 2024-07-11 | Stop reason: HOSPADM

## 2024-07-09 RX ORDER — OXYTOCIN/0.9 % SODIUM CHLORIDE 30/500 ML
PLASTIC BAG, INJECTION (ML) INTRAVENOUS
Status: COMPLETED
Start: 2024-07-09 | End: 2024-07-09

## 2024-07-09 RX ORDER — DIPHENHYDRAMINE HYDROCHLORIDE 50 MG/ML
25 INJECTION INTRAMUSCULAR; INTRAVENOUS EVERY 4 HOURS PRN
Status: DISCONTINUED | OUTPATIENT
Start: 2024-07-09 | End: 2024-07-11 | Stop reason: HOSPADM

## 2024-07-09 RX ORDER — SIMETHICONE 80 MG
1 TABLET,CHEWABLE ORAL 3 TIMES DAILY PRN
Status: DISCONTINUED | OUTPATIENT
Start: 2024-07-09 | End: 2024-07-11 | Stop reason: HOSPADM

## 2024-07-09 RX ORDER — METHYLERGONOVINE MALEATE 0.2 MG/ML
200 INJECTION INTRAVENOUS ONCE AS NEEDED
Status: DISCONTINUED | OUTPATIENT
Start: 2024-07-09 | End: 2024-07-11 | Stop reason: HOSPADM

## 2024-07-09 RX ADMIN — Medication 10 UNITS: at 02:07

## 2024-07-09 RX ADMIN — Medication 30 UNITS: at 03:07

## 2024-07-09 RX ADMIN — ACETAMINOPHEN 650 MG: 325 TABLET ORAL at 05:07

## 2024-07-09 RX ADMIN — Medication 95 MILLI-UNITS/MIN: at 02:07

## 2024-07-09 RX ADMIN — OXYTOCIN-SODIUM CHLORIDE 0.9% IV SOLN 30 UNIT/500ML 30 UNITS: 30-0.9/5 SOLUTION at 03:07

## 2024-07-09 RX ADMIN — ACETAMINOPHEN 650 MG: 325 TABLET ORAL at 11:07

## 2024-07-09 RX ADMIN — IBUPROFEN 800 MG: 400 TABLET ORAL at 10:07

## 2024-07-09 RX ADMIN — AMPICILLIN SODIUM 2 G: 2 INJECTION, POWDER, FOR SOLUTION INTRAMUSCULAR; INTRAVENOUS at 01:07

## 2024-07-09 RX ADMIN — SIMETHICONE 80 MG: 80 TABLET, CHEWABLE ORAL at 05:07

## 2024-07-09 NOTE — DISCHARGE INSTRUCTIONS
After a Vaginal Birth    General Discharge Instructions    May follow a regular diet, unless otherwise discussed with physician.  Take showers, not baths unless otherwise discussed with physician.  Activity as tolerated.  No lifting or heavy exercise for 6 weeks, no driving for 1 week, no sexual intercourse, douching or tampons for 6 weeks  May return to work/school as discussed with physician  Take medications as directed  Discuss birth control with physician  Breast care support bra worn at all times  Lactation consultant referral number ( [phone] or [phone])    Call Your Healthcare Provider Right Away If You Have:  A temperature of 100.4°F or higher.  If your blood pressure is over 140/90.  You have difficulty catching your breath or trouble breathing.  Heavy vaginal bleeding, clots, or vaginal discharge with foul odor. (heavier than menses)  Persistent nausea or vomiting.  You gain more than 3 pounds in 3 days.  Severe headaches not relieved by Tylenol (acetaminophen) or Motrin (ibuprofen)  Blurry or double vision, see spots or flashing lights.  Dizziness or fainting.  New onset swelling or worsening of existing swelling.  Burning or pain when you urinate.  No bowel movement for 5 days.  Redness, warmth, swelling, or pain in the lower leg.  Redness, discharge, or pain worse than you had in the hospital.  Burning, pain, red streaks, or lumpy areas in your breasts.  Cracks, blisters, or blood on your nipples.  Feelings of extreme sadness or anxiety, or a feeling that you dont want to be with your baby.  If you have any new or unusual symptoms or have questions or concerns    Some of these symptoms can occur up to 4 to 6 weeks after delivery. This can be a sign of pre-eclampsia, which is a serious disease that can cause stroke, seizures, organ damage, or death. Do not wait to call your doctor or seek medical attention.    If You Had Stitches  You may have received stitches in the skin near your vagina.  The stitches might have closed an episiotomy (an incision that enlarges the opening of the vagina). Or you may have needed stitches to repair torn skin. Either way, your stitches should dissolve within weeks. Until then, you can help reduce discomfort, aid healing, and reduce your risk of infection by keeping the stitches clean. These tips can help:    Gently wipe from front to back after you urinate or have a bowel movement.  After wiping, spray warm water on the area. Or you can have a sitz bath. This means sitting in a tub with a few inches of water in it. Then pat the area dry or use a hairdryer on a cool setting.  You can take a shower instead of a bath.  Change sanitary pads at least every 2-4 hours.  Place cold or heat packs on the area as directed by your doctors or nurses. Keep a thin towel between the pack and your skin.  Sit on firm seats so the stitches pull less.     Follow-Up  Schedule a  follow-up exam with your healthcare provider for about 6 weeks after delivery. During this exam, your uterus and vaginal area will be checked. Contact your healthcare provider if you think you or your baby are having any problems.                          Instrucciones de marycarmen de lactancia    [facility] de Apoyo a la Lactancia Materna [phone]     La [employer] recomienda la lactancia materna exclusiva joel los primeros 6 meses de karen y la continuación de la lactancia con la introducción de alimentos complementarios más allá del primer año de karen. La [employer] Rosa y la [employer] Ped[employer] recomiendan retrasar el uso del biberón y el chupete hasta después de las 4 semanas de edad y la lactancia esté hood establecida. La [employer] recomienda el uso de un chupete a la hora de la siesta y la hora de acostarse, wesley maria m estrategia de reducción de SMSL, para recién nacidos amamantados solo  "después de 1 mes de edad y la lactancia materna se ha establecido firmemente.   Alimente al bebé a la primera señal de hambre o comodidad  o Riya a la boca, movimientos de succión  o Buscando o buscando algo para chupar  o No espere a llorar - no es maria m señal tardía de hambre; es señal de angustia   Las alimentaciones pueden ser de 8 a 12 veces por 24 horas y no seguirán un horario   Alterne el seno con el que comienza la alimentación o comience con el seno que se siente más lleno   Cambie de seno cuando el bebé se martell del seno o se queda dormido   Continúe ofreciéndole los senos hasta que el bebé se emigdio lleno, ya no dé señales de hambre y permanezca dormido cuando lo coloque boca arriba en la cuna.   Si el bebé tiene sueño y no se despierta para alimentarlo, colóquelo piel con piel vestido con un pañal contra el pecho desnudo de la madre.   Duerma cerca de simpson bebé   El bebé debe colocarse y agarrarse al pecho correctamente  o Pecho contra pecho, mentón en el pecho  o Los labios del bebé están "volteados" hacia afuera  o La boca del bebé está completamente abierta wesley un grito  o La succión del bebé debe sentirse wesley si estuviera tirando de la madre.  ? El bebé debe beber del pecho:  o Debería oír tragar o tragar saliva joel la alimentación.  o Debería ana laura leche en los labios del bebé cuando se martell el pecho  o Addie senos deben estar más suaves cuando el bebé termine de mamar  o El bebé debe verse relajado al final de las angela.  o Después del 4to día y simpson leche está en:  o La elizabet del bebé debe volverse de color amarillo brillante y estar suelta, acuosa y con semillas  o El bebé debe tener al menos 3-4 cacas del tamaño de la drummond de simpson mano por día  o El bebé debe tener al menos 6-8 pañales mojados por día  o La orina debe ser de color amarillo arabella  Debe beber cuando tenga sed y comer maria m dieta saludable cuando tenga  hambriento.  Ninfa siestas para obtener el descanso que necesitas.  Jolley medicamentos " y/o fidel alcohol solo con el permiso de simpson obstetra  o el pediatra del bebé. También puede llamar al [facility] de [place] [facility],  ([phone]), de lunes a viernes, de 8 a. m. a 5 p. m., [facility], para aprovechar al ignacio  información actualizada basada en evidencia sobre el uso de medicamentos joel  embarazo y lactancia.    El bebé debe ser examinado por un pediatra a los 3-5 días de nacido; a menos que lo ordene antes el pediatra.  Dina vez que le baje la leche, el bebé debe volver al peso de nacimiento a más tardar entre los 10 y 14 días de nacido.    Si tiene problemas con la lactancia o tiene alguna pregunta sobre la lactancia, llame a los servicios de apoyo a la lactancia de [facility] al [phone] de lunes a viernes de 9 a. m. a 5 p. m.    Recursos para la lactancia:    Bebé Café: [phone]    Liga de [place]ec[place]: [phone] [facility] de Lactancia Materna [facility] Baby Café: [email or web address]      Ingurgitación primaria:    Si la leche fluye, aplique calor húmedo o seco en los senos joel aproximadamente 10 minutos antes de cada daniel wesley medida de comodidad para facilitar el reflejo de eyección de la leche.    Siga el tratamiento térmico con un masaje de senos para suavizar las áreas duras o con bultos del seno.    Use alimentaciones sin restricciones, frecuentes y efectivas    Despierta al bebé para alimentarlo si es necesario.    Evite la alimentación con chupete y biberón    Extracción manual o bomba de senos hasta el punto de comodidad según sea necesario    Use tratamientos fríos en forma de bolsas de hielo/bolsas de gel/verduras congeladas envueltas en un paño suave y hinds y aplíquelas en los senos joel aproximadamente 20 minutos después de cada alimentación hasta que se resuelva la congestión.    Use un sostén cómodo y de apoyo.    [place] analgésicos según sea necesario    Use medicamentos antiinflamatorios si los prescribe el  médico.

## 2024-07-09 NOTE — SUBJECTIVE & OBJECTIVE
Obstetric HPI:  Patient reports Intensity: strong contractions, active fetal movement, No vaginal bleeding , No loss of fluid     This pregnancy has been complicated by AMA and limited PNC.    OB History    Para Term  AB Living   2 1 1 0 0 1   SAB IAB Ectopic Multiple Live Births   0 0 0 0 1      # Outcome Date GA Lbr Davey/2nd Weight Sex Type Anes PTL Lv   2 Current            1 Term      Vag-Spont   CHELLY     No past medical history on file.  No past surgical history on file.    No medications prior to admission.       Review of patient's allergies indicates:  No Known Allergies     Family History    None       Tobacco Use    Smoking status: Never    Smokeless tobacco: Never   Substance and Sexual Activity    Alcohol use: Never    Drug use: Never    Sexual activity: Yes     Partners: Male     Review of Systems   Constitutional: Negative.    Respiratory: Negative.     Cardiovascular: Negative.    Gastrointestinal: Negative.    Endocrine: Negative.    Genitourinary: Negative.    Musculoskeletal: Negative.    Neurological: Negative.    Hematological: Negative.    Psychiatric/Behavioral: Negative.     Breast: negative.       Objective:     Vital Signs (Most Recent):  Temp: 98.2 °F (36.8 °C) (24 1304)  Pulse: 90 (24 1306)  Resp: 20 (24 1304)  BP: 110/68 (24 1304)  SpO2: 100 % (24 1306) Vital Signs (24h Range):  Temp:  [98.2 °F (36.8 °C)] 98.2 °F (36.8 °C)  Pulse:  [76-90] 90  Resp:  [20] 20  SpO2:  [99 %-100 %] 100 %  BP: (110)/(68) 110/68     Weight: 53.6 kg (118 lb 2.7 oz)  Body mass index is 20.28 kg/m².    FHT: 150, reactive Cat 1 (reassuring)  TOCO:  Q 2-3 minutes     Physical Exam:   Constitutional: She is oriented to person, place, and time. She appears well-developed and well-nourished. No distress.    HENT:   Head: Normocephalic and atraumatic.       Pulmonary/Chest: Effort normal. No respiratory distress.        Abdominal: Soft. She exhibits no distension and no mass.  There is no abdominal tenderness. There is no rebound and no guarding.             Musculoskeletal: Normal range of motion and moves all extremeties. No tenderness.       Neurological: She is alert and oriented to person, place, and time. No cranial nerve deficit. Coordination normal.    Skin: She is not diaphoretic.    Psychiatric: She has a normal mood and affect. Her behavior is normal. Judgment and thought content normal.        Cervix:  Dilation:  6  Effacement:  90%  Station: -2  Presentation: Vertex     Significant Labs:  Lab Results   Component Value Date    GROUPTRH O POS 04/25/2024    HEPBSAG Non-reactive 04/25/2024       I have personallly reviewed all pertinent lab results from the last 24 hours.

## 2024-07-09 NOTE — H&P
[facility] - Labor & Delivery  Obstetrics  History & Physical    Patient Name: Franko Delaney  MRN: [id number]  Admission Date: 2024  Primary Care Provider: No, Primary Doctor    Subjective:     Principal Problem:<principal problem not specified>    History of Present Illness:  34 yr  @ 38 wk 6 d presenting to L&D in active labor.  Preg has been complicated by AMA and limited PNC.  No other complicated noted.  Pt states she has been having ctx's since last night.    Obstetric HPI:  Patient reports Intensity: strong contractions, active fetal movement, No vaginal bleeding , No loss of fluid     This pregnancy has been complicated by AMA and limited PNC.    OB History    Para Term  AB Living   2 1 1 0 0 1   SAB IAB Ectopic Multiple Live Births   0 0 0 0 1      # Outcome Date GA Lbr Davey/2nd Weight Sex Type Anes PTL Lv   2 Current            1 Term      Vag-Spont   CHELLY     No past medical history on file.  No past surgical history on file.    No medications prior to admission.       Review of patient's allergies indicates:  No Known Allergies     Family History    None       Tobacco Use    Smoking status: Never    Smokeless tobacco: Never   Substance and Sexual Activity    Alcohol use: Never    Drug use: Never    Sexual activity: Yes     Partners: Male     Review of Systems   Constitutional: Negative.    Respiratory: Negative.     Cardiovascular: Negative.    Gastrointestinal: Negative.    Endocrine: Negative.    Genitourinary: Negative.    Musculoskeletal: Negative.    Neurological: Negative.    Hematological: Negative.    Psychiatric/Behavioral: Negative.     Breast: negative.       Objective:     Vital Signs (Most Recent):  Temp: 98.2 °F (36.8 °C) (24 1304)  Pulse: 90 (24 1306)  Resp: 20 (24 1304)  BP: 110/68 (24 1304)  SpO2: 100 % (24 1306) Vital Signs (24h Range):  Temp:  [98.2 °F (36.8 °C)] 98.2 °F (36.8 °C)  Pulse:  [76-90] 90  Resp:  [20] 20  SpO2:  [99  %-100 %] 100 %  BP: (110)/(68) 110/68     Weight: 53.6 kg (118 lb 2.7 oz)  Body mass index is 20.28 kg/m².    FHT: 150, reactive Cat 1 (reassuring)  TOCO:  Q 2-3 minutes     Physical Exam:   Constitutional: She is oriented to person, place, and time. She appears well-developed and well-nourished. No distress.    HENT:   Head: Normocephalic and atraumatic.       Pulmonary/Chest: Effort normal. No respiratory distress.        Abdominal: Soft. She exhibits no distension and no mass. There is no abdominal tenderness. There is no rebound and no guarding.             Musculoskeletal: Normal range of motion and moves all extremeties. No tenderness.       Neurological: She is alert and oriented to person, place, and time. No cranial nerve deficit. Coordination normal.    Skin: She is not diaphoretic.    Psychiatric: She has a normal mood and affect. Her behavior is normal. Judgment and thought content normal.        Cervix:  Dilation:  6  Effacement:  90%  Station: -2  Presentation: Vertex     Significant Labs:  Lab Results   Component Value Date    GROUPTRH O POS 2024    HEPBSAG Non-reactive 2024       I have personallly reviewed all pertinent lab results from the last 24 hours.  Assessment/Plan:     34 y.o. female  at 38w6d for:    38 weeks gestation of pregnancy  Pt in active labor, anticipate     Pain during labor  Pt declines any anesthesia    AMA (advanced maternal age) multigravida 35+, third trimester  Pt in active labor, anticipate     Late prenatal care affecting pregnancy in third trimester  Pt in active labor, anticipate         Josué David MD  Obstetrics  [facility] - [place] - Labor & Delivery

## 2024-07-09 NOTE — NURSING
Pt arrived to unit with c/o of abdominal pain since 0300 . EFMx2 placed. Full assessment done. History and medications reviewed. Pt reports good fetal movement. Denies vaginal bleeding, LOF, HA, blurry vision, spots before the eyes, swelling of face/hands/feet, RUQ pain, any problems with urination. Abdomen soft non-tender. POC reviewed with pt, verbalizes understanding. Will continue to monitor pt.

## 2024-07-09 NOTE — HOSPITAL COURSE
Pt noted to be 6 cm dilated upon presentation with bulging membranes.  AROM done and mod/thick meconium noted.  Pt GBS+ and pt was able to receive 1 dose IV ampicillin.  Pt had uncomplicated .    7/10/24:  PPD #1, pt doing well, denies any c/o    24:  PPD #2, routine care, d/c today

## 2024-07-09 NOTE — HPI
34 yr  @ 38 wk 6 d presenting to L&D in active labor.  Preg has been complicated by AMA and limited PNC.  No other complicated noted.  Pt states she has been having ctx's since last night.

## 2024-07-09 NOTE — TREATMENT PLAN
[facility] Reilly at bedside.

## 2024-07-09 NOTE — LACTATION NOTE
07/09/24 1505   Maternal Assessment   Breast Density Bilateral:;soft   Areola Bilateral:;elastic   Nipples Bilateral:;everted   Maternal Infant Feeding   Maternal Emotional State assist needed   Infant Positioning laid back (ventral)   Signs of Milk Transfer audible swallow;infant jaw motion present   Pain with Feeding no   Comfort Measures Before/During Feeding infant position adjusted;latch adjusted;maternal position adjusted   Latch Assistance yes     Assisted to latch baby to left breast in laid back position while baby skin to skin with mother after delivery. Baby latched deeply, nursing well with audible swallows. Mother denies pain during feeding.  Reviewed basic breastfeeding instructions with family member at bedside to interpret, per patient preference. Encouraged to call me for any further breastfeeding assistance. Patient verbalizes understanding of all instructions with good recall.    Instructed on proper latch to facilitate effective breastfeeding.  Discussed recognizing hunger cues, appropriate positioning and wide mouth latch.  Discussed ways to determine an effective latch including:  areola included in latch, rhythmic/nutritive sucking and audible swallowing.  Also discussed soreness/tenderness associated with latch and prevention and treatment.  Pt states understanding and verbalized appropriate recall.

## 2024-07-10 PROBLEM — R52 PAIN DURING LABOR: Status: RESOLVED | Noted: 2024-07-09 | Resolved: 2024-07-10

## 2024-07-10 PROBLEM — Z3A.38 38 WEEKS GESTATION OF PREGNANCY: Status: RESOLVED | Noted: 2024-07-09 | Resolved: 2024-07-10

## 2024-07-10 PROBLEM — O09.523 AMA (ADVANCED MATERNAL AGE) MULTIGRAVIDA 35+, THIRD TRIMESTER: Status: RESOLVED | Noted: 2024-06-04 | Resolved: 2024-07-10

## 2024-07-10 PROBLEM — O09.33 LATE PRENATAL CARE AFFECTING PREGNANCY IN THIRD TRIMESTER: Status: RESOLVED | Noted: 2024-06-04 | Resolved: 2024-07-10

## 2024-07-10 LAB
BASOPHILS # BLD AUTO: 0.02 K/UL (ref 0–0.2)
BASOPHILS NFR BLD: 0.2 % (ref 0–1.9)
DIFFERENTIAL METHOD BLD: ABNORMAL
EOSINOPHIL # BLD AUTO: 0.1 K/UL (ref 0–0.5)
EOSINOPHIL NFR BLD: 0.6 % (ref 0–8)
ERYTHROCYTE [DISTWIDTH] IN BLOOD BY AUTOMATED COUNT: 13.8 % (ref 11.5–14.5)
HCT VFR BLD AUTO: 30.6 % (ref 37–48.5)
HGB BLD-MCNC: 9.7 G/DL (ref 12–16)
IMM GRANULOCYTES # BLD AUTO: 0.04 K/UL (ref 0–0.04)
IMM GRANULOCYTES NFR BLD AUTO: 0.4 % (ref 0–0.5)
LYMPHOCYTES # BLD AUTO: 1.3 K/UL (ref 1–4.8)
LYMPHOCYTES NFR BLD: 14 % (ref 18–48)
MCH RBC QN AUTO: 27.6 PG (ref 27–31)
MCHC RBC AUTO-ENTMCNC: 31.7 G/DL (ref 32–36)
MCV RBC AUTO: 87 FL (ref 82–98)
MONOCYTES # BLD AUTO: 1 K/UL (ref 0.3–1)
MONOCYTES NFR BLD: 10.8 % (ref 4–15)
NEUTROPHILS # BLD AUTO: 7 K/UL (ref 1.8–7.7)
NEUTROPHILS NFR BLD: 74 % (ref 38–73)
NRBC BLD-RTO: 0 /100 WBC
PLATELET # BLD AUTO: 275 K/UL (ref 150–450)
PMV BLD AUTO: 10.4 FL (ref 9.2–12.9)
RBC # BLD AUTO: 3.52 M/UL (ref 4–5.4)
TREPONEMA PALLIDUM IGG+IGM AB [PRESENCE] IN SERUM OR PLASMA BY IMMUNOASSAY: NONREACTIVE
WBC # BLD AUTO: 9.5 K/UL (ref 3.9–12.7)

## 2024-07-10 PROCEDURE — 11000001 HC ACUTE MED/SURG PRIVATE ROOM

## 2024-07-10 PROCEDURE — 36415 COLL VENOUS BLD VENIPUNCTURE: CPT | Performed by: OBSTETRICS & GYNECOLOGY

## 2024-07-10 PROCEDURE — 99231 SBSQ HOSP IP/OBS SF/LOW 25: CPT | Mod: ,,, | Performed by: OBSTETRICS & GYNECOLOGY

## 2024-07-10 PROCEDURE — 25000003 PHARM REV CODE 250: Performed by: OBSTETRICS & GYNECOLOGY

## 2024-07-10 PROCEDURE — 85025 COMPLETE CBC W/AUTO DIFF WBC: CPT | Performed by: OBSTETRICS & GYNECOLOGY

## 2024-07-10 RX ADMIN — PRENATAL VIT W/ FE FUMARATE-FA TAB 27-0.8 MG 1 TABLET: 27-0.8 TAB at 08:07

## 2024-07-10 RX ADMIN — ACETAMINOPHEN 650 MG: 325 TABLET ORAL at 05:07

## 2024-07-10 RX ADMIN — ACETAMINOPHEN 650 MG: 325 TABLET ORAL at 12:07

## 2024-07-10 RX ADMIN — ACETAMINOPHEN 650 MG: 325 TABLET ORAL at 06:07

## 2024-07-10 RX ADMIN — IBUPROFEN 800 MG: 400 TABLET ORAL at 05:07

## 2024-07-10 RX ADMIN — IBUPROFEN 800 MG: 400 TABLET ORAL at 09:07

## 2024-07-10 RX ADMIN — ACETAMINOPHEN 650 MG: 325 TABLET ORAL at 11:07

## 2024-07-10 RX ADMIN — IBUPROFEN 800 MG: 400 TABLET ORAL at 01:07

## 2024-07-10 NOTE — L&D DELIVERY NOTE
[facility] - Mother & Baby  Vaginal Delivery   Operative Note    SUMMARY     Normal spontaneous vaginal delivery of live infant, was placed on mothers abdomen for skin to skin and bulb suctioning performed.  Infant delivered position OA over intact perineum.  Nuchal cord: No.    Spontaneous delivery of placenta and IV pitocin given noting good uterine tone.  No lacerations noted.  Patient tolerated delivery well. Sponge needle and lap counted correctly x2.    Indications: Vaginal delivery  Pregnancy complicated by:   Patient Active Problem List   Diagnosis    Late prenatal care affecting pregnancy in third trimester    AMA (advanced maternal age) multigravida 35+, third trimester    Pain during labor    38 weeks gestation of pregnancy    Vaginal delivery     Admitting GA: 38w6d    Delivery Information for Tulio Delaney    Birth information:  YOB: 2025   Time of birth: 2:20 PM   Sex: male   Head Delivery Date/Time: 2024  2:20 PM   Delivery type: Vaginal, Spontaneous   Gestational Age: 38w6d        Delivery Providers    Delivering clinician: Josué David MD   Provider Role    Ashly Lezama RN Registered Nurse    Vilma Arreguin RN Registered Nurse    [facility]    Shelby Merchant RN Registered Nurse              Measurements    Weight: 2990 g  Weight (lbs): 6 lb 9.5 oz  Length:          Apgars    Living status: Living  Apgar Component Scores:  1 min.:  5 min.:  10 min.:  15 min.:  20 min.:    Skin color:  0  1       Heart rate:  2  2       Reflex irritability:  2  2       Muscle tone:  2  2       Respiratory effort:  2  2       Total:  8  9       Apgars assigned by: SHELBY MERCHANT         Operative Delivery    Forceps attempted?: No  Vacuum extractor attempted?: No         Shoulder Dystocia    Shoulder dystocia present?: No           Presentation    Presentation: Vertex  Position: Middle           Interventions/Resuscitation    Method: Bulb Suctioning, Tactile  Stimulation, NICU Attended       Cord    Vessels: 3 vessels  Complications: None  Delayed Cord Clamping?: No  Cord Blood Disposition: Sent with Baby  Gases Sent?: No  Stem Cell Collection (by MD): No       Placenta    Placenta delivery date/time: 2024 1423  Placenta removal: Spontaneous  Placenta appearance: Intact  Placenta disposition: Discarded           Labor Events:       labor: No     Labor Onset Date/Time:         Dilation Complete Date/Time:         Start Pushing Date/Time:         Start Pushing Date/Time:       Rupture Date/Time: 24  1345         Rupture type: ARM (Artificial Rupture)         Fluid Amount:       Fluid Color:                steroids: None     Antibiotics given for GBS: Yes     Induction: none     Indications for induction:        Augmentation: amniotomy     Indications for augmentation: Ineffective Contraction Pattern     Labor complications: None     Additional complications: Meconium In Amniotic Fluid        Cervical ripening:                     Delivery:      Episiotomy: None     Indication for Episiotomy:       Perineal Lacerations:   Repaired:      Periurethral Laceration:   Repaired:     Labial Laceration:   Repaired:     Sulcus Laceration:   Repaired:     Vaginal Laceration:   Repaired:     Cervical Laceration:   Repaired:     Repair suture: None     Repair # of packets:       Last Value - EBL - Nursing (mL):       Sum - EBL - Nursing (mL): 0     Last Value - EBL - Anesthesia (mL):      Calculated QBL (mL): 100      Running total QBL (mL): 100      Vaginal Sweep Performed: Yes     Surgicount Correct: Yes     Vaginal Packing: No Quantity:       Other providers:       Anesthesia    Method: None          Details (if applicable):  Trial of Labor      Categorization:      Priority:     Indications for :     Incision Type:       Additional  information:  Forceps:    Vacuum:    Breech:    Observed anomalies    Other (Comments):

## 2024-07-10 NOTE — PLAN OF CARE
VSS, breast and formula feeding per request. Supportive bra encouraged. Fundus and lochia WDL. Voiding, passing flatus, ambulating and tolerating regular diet. Bonding well with baby. Pain being managed with motrin and tylenol. Stated an understanding to POC.

## 2024-07-10 NOTE — PROGRESS NOTES
[facility] - [place] Mother & Baby  Obstetrics  Postpartum Progress Note    Patient Name: Franko Delaney  MRN: [id number]  Admission Date: 2024  Hospital Length of Stay: 1 days  Attending Physician: Josué David MD  Primary Care Provider: Marcela, Primary Doctor    Subjective:     Principal Problem:Vaginal delivery    Hospital Course:  Pt noted to be 6 cm dilated upon presentation with bulging membranes.  AROM done and mod/thick meconium noted.  Pt GBS+ and pt was able to receive 1 dose IV ampicillin.  Pt had uncomplicated .    7/10/24:  PPD #1, pt doing well, denies any c/o    Interval History: PPD #1 s/p     She is doing well this morning. She is tolerating a regular diet without nausea or vomiting. She is voiding spontaneously. She is ambulating. She has passed flatus, and has not a BM. Vaginal bleeding is mild. She denies fever or chills. Abdominal pain is mild and controlled with oral medications. She Is breastfeeding. She desires circumcision for her male baby: yes.    Objective:     Vital Signs (Most Recent):  Temp: 98 °F (36.7 °C) (07/10/24 0505)  Pulse: 74 (07/10/24 0505)  Resp: 18 (07/10/24 0505)  BP: 109/68 (07/10/24 0505)  SpO2: 100 % (07/10/24 050) Vital Signs (24h Range):  Temp:  [98 °F (36.7 °C)-98.5 °F (36.9 °C)] 98 °F (36.7 °C)  Pulse:  [72-96] 74  Resp:  [16-20] 18  SpO2:  [92 %-100 %] 100 %  BP: (109-128)/(60-76) 109/68     Weight: 53.6 kg (118 lb 2.7 oz)  Body mass index is 20.28 kg/m².      Intake/Output Summary (Last 24 hours) at 7/10/2024 0705  Last data filed at 2024 1750  Gross per 24 hour   Intake --   Output 1100 ml   Net -1100 ml         Significant Labs:  Lab Results   Component Value Date    GROUPTRH O POS 2024    HEPBSAG Non-reactive 2024     Recent Labs   Lab 07/10/24  0625   HGB 9.7*   HCT 30.6*       I have personallly reviewed all pertinent lab results from the last 24 hours.    Physical Exam:   Constitutional: She is oriented to person, place, and time. She  appears well-developed and well-nourished. No distress.    HENT:   Head: Normocephalic and atraumatic.     Neck: No thyromegaly present.     Pulmonary/Chest: Effort normal. No respiratory distress.        Abdominal: Soft. She exhibits no distension, no mass and no abdominal incision. There is no abdominal tenderness. There is no rebound and no guarding.             Musculoskeletal: Normal range of motion and moves all extremeties. No tenderness.       Neurological: She is alert and oriented to person, place, and time. No cranial nerve deficit.    Skin: Skin is warm. She is not diaphoretic.    Psychiatric: She has a normal mood and affect. Her behavior is normal. Judgment and thought content normal.         Assessment/Plan:     34 y.o. female  for:    * Vaginal delivery  Routine pp care        Disposition: As patient meets milestones, will plan to discharge tomorrow.    Josué David MD  Obstetrics  [facility] - Mother & Baby

## 2024-07-10 NOTE — LACTATION NOTE
07/10/24 0837   Maternal Assessment   Breast Density Bilateral:;soft   Areola Bilateral:;elastic   Nipples Bilateral:;everted;Left:;inverted  (tip)   Maternal Infant Feeding   Maternal Emotional State independent;relaxed   Infant Positioning cradle   Signs of Milk Transfer audible swallow;infant jaw motion present   Pain with Feeding no   Latch Assistance no     Mother with baby cueing to feed after pediatrician exam and diaper change-mother able to latch baby easily with strong sucking and swallows noted -mother states baby sleepy overnight for a couple of feedings and would not wake and was given formula -reinforced risks of formula and artificial nipples to breastfeeding and encouraged baby at breast on demand today -encouraged call for any assistance to wake and feed baby -AMN [place] creole  #[id number] used for education

## 2024-07-10 NOTE — SUBJECTIVE & OBJECTIVE
Interval History: PPD #1 s/p     She is doing well this morning. She is tolerating a regular diet without nausea or vomiting. She is voiding spontaneously. She is ambulating. She has passed flatus, and has not a BM. Vaginal bleeding is mild. She denies fever or chills. Abdominal pain is mild and controlled with oral medications. She Is breastfeeding. She desires circumcision for her male baby: yes.    Objective:     Vital Signs (Most Recent):  Temp: 98 °F (36.7 °C) (07/10/24 050)  Pulse: 74 (07/10/24 0505)  Resp: 18 (07/10/24 0505)  BP: 109/68 (07/10/24 0505)  SpO2: 100 % (07/10/24 0505) Vital Signs (24h Range):  Temp:  [98 °F (36.7 °C)-98.5 °F (36.9 °C)] 98 °F (36.7 °C)  Pulse:  [72-96] 74  Resp:  [16-20] 18  SpO2:  [92 %-100 %] 100 %  BP: (109-128)/(60-76) 109/68     Weight: 53.6 kg (118 lb 2.7 oz)  Body mass index is 20.28 kg/m².      Intake/Output Summary (Last 24 hours) at 7/10/2024 0705  Last data filed at 2024 1750  Gross per 24 hour   Intake --   Output 1100 ml   Net -1100 ml         Significant Labs:  Lab Results   Component Value Date    GROUPTRH O POS 2024    HEPBSAG Non-reactive 2024     Recent Labs   Lab 07/10/24  0625   HGB 9.7*   HCT 30.6*       I have personallly reviewed all pertinent lab results from the last 24 hours.    Physical Exam:   Constitutional: She is oriented to person, place, and time. She appears well-developed and well-nourished. No distress.    HENT:   Head: Normocephalic and atraumatic.     Neck: No thyromegaly present.     Pulmonary/Chest: Effort normal. No respiratory distress.        Abdominal: Soft. She exhibits no distension, no mass and no abdominal incision. There is no abdominal tenderness. There is no rebound and no guarding.             Musculoskeletal: Normal range of motion and moves all extremeties. No tenderness.       Neurological: She is alert and oriented to person, place, and time. No cranial nerve deficit.    Skin: Skin is warm. She is not  diaphoretic.    Psychiatric: She has a normal mood and affect. Her behavior is normal. Judgment and thought content normal.

## 2024-07-11 VITALS
HEIGHT: 64 IN | DIASTOLIC BLOOD PRESSURE: 74 MMHG | TEMPERATURE: 97 F | SYSTOLIC BLOOD PRESSURE: 111 MMHG | WEIGHT: 118.19 LBS | RESPIRATION RATE: 20 BRPM | HEART RATE: 84 BPM | BODY MASS INDEX: 20.18 KG/M2 | OXYGEN SATURATION: 100 %

## 2024-07-11 PROCEDURE — 99238 HOSP IP/OBS DSCHRG MGMT 30/<: CPT | Mod: ,,, | Performed by: OBSTETRICS & GYNECOLOGY

## 2024-07-11 PROCEDURE — 25000003 PHARM REV CODE 250: Performed by: OBSTETRICS & GYNECOLOGY

## 2024-07-11 RX ORDER — IBUPROFEN 800 MG/1
800 TABLET ORAL EVERY 8 HOURS
Qty: 40 TABLET | Refills: 0 | Status: SHIPPED | OUTPATIENT
Start: 2024-07-11

## 2024-07-11 RX ADMIN — ACETAMINOPHEN 650 MG: 325 TABLET ORAL at 12:07

## 2024-07-11 RX ADMIN — IBUPROFEN 800 MG: 400 TABLET ORAL at 06:07

## 2024-07-11 RX ADMIN — ACETAMINOPHEN 650 MG: 325 TABLET ORAL at 06:07

## 2024-07-11 RX ADMIN — PRENATAL VIT W/ FE FUMARATE-FA TAB 27-0.8 MG 1 TABLET: 27-0.8 TAB at 09:07

## 2024-07-11 NOTE — PLAN OF CARE
Plan breastfeed on demand at least 8 times in 24 hours and monitor wet and dirty diapers over the next few days

## 2024-07-11 NOTE — DISCHARGE SUMMARY
[facility] - Mother & Baby  Obstetrics  Discharge Summary      Patient Name: Franko Delaney  MRN: [id number]  Admission Date: 2024  Hospital Length of Stay: 2 days  Discharge Date and Time:  2024 7:35 AM  Attending Physician: Josué David MD   Discharging Provider: Josué David MD   Primary Care Provider: Marcela, Primary Doctor    HPI: 34 yr  @ 38 wk 6 d presenting to L&D in active labor.  Preg has been complicated by AMA and limited PNC.  No other complicated noted.  Pt states she has been having ctx's since last night.        * No surgery found *     Hospital Course:   Pt noted to be 6 cm dilated upon presentation with bulging membranes.  AROM done and mod/thick meconium noted.  Pt GBS+ and pt was able to receive 1 dose IV ampicillin.  Pt had uncomplicated .    7/10/24:  PPD #1, pt doing well, denies any c/o    24:  PPD #2, routine care, d/c today         Final Active Diagnoses:    Diagnosis Date Noted POA    PRINCIPAL PROBLEM:  Vaginal delivery [O80] 2024 Not Applicable      Problems Resolved During this Admission:    Diagnosis Date Noted Date Resolved POA    Pain during labor [O99.892, R52] 2024 07/10/2024 Yes    38 weeks gestation of pregnancy [Z3A.38] 2024 07/10/2024 Not Applicable    AMA (advanced maternal age) multigravida 35+, third trimester [O09.523] 2024 07/10/2024 Yes    Late prenatal care affecting pregnancy in third trimester [O09.33] 2024 07/10/2024 Not Applicable        Significant Diagnostic Studies: Labs: CBC   Recent Labs   Lab 24  1340 07/10/24  0625   WBC 6.52 9.50   HGB 10.9* 9.7*   HCT 34.2* 30.6*    275         Feeding Method: breast    Immunizations       Date Immunization Status Dose Route/Site Given by    24 1543 MMR Incomplete 0.5 mL Subcutaneous/     24 1543 Tdap Incomplete 0.5 mL Intramuscular/             Delivery:    Episiotomy: None   Lacerations:     Repair suture: None   Repair # of packets:      Blood loss (ml):       Birth information:  YOB: 2025   Time of birth: 2:20 PM   Sex: male   Delivery type: Vaginal, Spontaneous   Gestational Age: 38w6d     Measurements    Weight: 2990 g  Weight (lbs): 6 lb 9.5 oz  Length:          Delivery Clinician: Delivery Providers    Delivering clinician: Josué David MD   Provider Role    Ashly Lezama RN Registered Nurse    Vilma Arreguin RN Registered Nurse    Latonya, [place] [facility]    Shelby Merchant RN Registered Nurse             Additional  information:  Forceps:    Vacuum:    Breech:    Observed anomalies      Living?:     Apgars    Living status: Living  Apgar Component Scores:  1 min.:  5 min.:  10 min.:  15 min.:  20 min.:    Skin color:  0  1       Heart rate:  2  2       Reflex irritability:  2  2       Muscle tone:  2  2       Respiratory effort:  2  2       Total:  8  9       Apgars assigned by: SHELBY MERCHANT         Placenta: Delivered:       appearance  Pending Diagnostic Studies:       None            Discharged Condition: good    Disposition: Home or Self Care    Follow Up:   Follow-up Information       Josué David MD Follow up in 5 week(s).    Specialties: Obstetrics, Obstetrics and Gynecology  Why: postpartum care  Contact information:  [address]  [address] [place], [place] [address] [phone]                           Patient Instructions:      BREAST PUMP FOR HOME USE     Order Specific Question Answer Comments   Type of pump: Electric    Weight: 53.6 kg (118 lb 2.7 oz)    Length of need (1-99 months): 99      Diet Adult Regular     Pelvic Rest     Lifting restrictions     Notify your health care provider if you experience any of the following:  temperature >100.4     Notify your health care provider if you experience any of the following:  persistent nausea and vomiting or diarrhea     Notify your health care provider if you experience any of the following:  severe uncontrolled pain     Notify your health care  provider if you experience any of the following:  redness, tenderness, or signs of infection (pain, swelling, redness, odor or green/yellow discharge around incision site)     Notify your health care provider if you experience any of the following:  difficulty breathing or increased cough     Notify your health care provider if you experience any of the following:  severe persistent headache     Notify your health care provider if you experience any of the following:  persistent dizziness, light-headedness, or visual disturbances     Notify your health care provider if you experience any of the following:  increased confusion or weakness     Weight bearing restrictions (specify):     Medications:  Current Discharge Medication List        START taking these medications    Details   ibuprofen (ADVIL,MOTRIN) 800 MG tablet Take 1 tablet (800 mg total) by mouth every 8 (eight) hours.  Qty: 40 tablet, Refills: 0    Associated Diagnoses: Vaginal delivery             Josué David MD  Obstetrics  [facility] - Mother [facility] Baby

## 2024-07-11 NOTE — SUBJECTIVE & OBJECTIVE
Interval History: PPD #2 s/p     She is doing well this morning. She is tolerating a regular diet without nausea or vomiting. She is voiding spontaneously. She is ambulating. She has passed flatus, and has not a BM. Vaginal bleeding is mild. She denies fever or chills. Abdominal pain is mild and controlled with oral medications. She Is breastfeeding. She desires circumcision for her male baby: yes.    Objective:     Vital Signs (Most Recent):  Temp: 98.2 °F (36.8 °C) (24)  Pulse: 81 (24)  Resp: 18 (24)  BP: 111/73 (24)  SpO2: 99 % (24) Vital Signs (24h Range):  Temp:  [98 °F (36.7 °C)-98.3 °F (36.8 °C)] 98.2 °F (36.8 °C)  Pulse:  [75-88] 81  Resp:  [18] 18  SpO2:  [98 %-100 %] 99 %  BP: (105-112)/(62-81) 111/73     Weight: 53.6 kg (118 lb 2.7 oz)  Body mass index is 20.28 kg/m².    No intake or output data in the 24 hours ending 24 0731      Significant Labs:  Lab Results   Component Value Date    [facility]    HEPBSAG Non-reactive 2024     Recent Labs   Lab 07/10/24  0625   HGB 9.7*   HCT 30.6*       I have personallly reviewed all pertinent lab results from the last 24 hours.    Physical Exam:   Constitutional: She is oriented to person, place, and time. She appears well-developed and well-nourished. No distress.    HENT:   Head: Normocephalic and atraumatic.     Neck: No thyromegaly present.     Pulmonary/Chest: Effort normal. No respiratory distress.        Abdominal: Soft. She exhibits no distension and no mass. There is no abdominal tenderness. There is no rebound and no guarding.             Musculoskeletal: Normal range of motion and moves all extremeties. No tenderness.       Neurological: She is alert and oriented to person, place, and time. No cranial nerve deficit. Coordination normal.    Skin: Skin is warm. She is not diaphoretic. No erythema.    Psychiatric: She has a normal mood and affect. Her behavior is normal.  Judgment and thought content normal.

## 2024-07-11 NOTE — PROGRESS NOTES
[facility]  Obstetrics  Postpartum Progress Note    Patient Name: Franko Delaney  MRN: [id number]  Admission Date: 2024  Hospital Length of Stay: 2 days  Attending Physician: Josué David MD  Primary Care Provider: Marcela, Primary Doctor    Subjective:     Principal Problem:Vaginal delivery    Hospital Course:  Pt noted to be 6 cm dilated upon presentation with bulging membranes.  AROM done and mod/thick meconium noted.  Pt GBS+ and pt was able to receive 1 dose IV ampicillin.  Pt had uncomplicated .    7/10/24:  PPD #1, pt doing well, denies any c/o    24:  PPD #2, routine care, d/c today    Interval History: PPD #2 s/p     She is doing well this morning. She is tolerating a regular diet without nausea or vomiting. She is voiding spontaneously. She is ambulating. She has passed flatus, and has not a BM. Vaginal bleeding is mild. She denies fever or chills. Abdominal pain is mild and controlled with oral medications. She Is breastfeeding. She desires circumcision for her male baby: yes.    Objective:     Vital Signs (Most Recent):  Temp: 98.2 °F (36.8 °C) (24)  Pulse: 81 (24)  Resp: 18 (24)  BP: 111/73 (24)  SpO2: 99 % (24) Vital Signs (24h Range):  Temp:  [98 °F (36.7 °C)-98.3 °F (36.8 °C)] 98.2 °F (36.8 °C)  Pulse:  [75-88] 81  Resp:  [18] 18  SpO2:  [98 %-100 %] 99 %  BP: (105-112)/(62-81) 111/73     Weight: 53.6 kg (118 lb 2.7 oz)  Body mass index is 20.28 kg/m².    No intake or output data in the 24 hours ending 24 0731      Significant Labs:  Lab Results   Component Value Date    GROUPTRH O POS 2024    HEPBSAG Non-reactive 2024     Recent Labs   Lab 07/10/24  0625   HGB 9.7*   HCT 30.6*       I have personallly reviewed all pertinent lab results from the last 24 hours.    Physical Exam:   Constitutional: She is oriented to person, place, and time. She appears well-developed and well-nourished. No distress.     HENT:   Head: Normocephalic and atraumatic.     Neck: No thyromegaly present.     Pulmonary/Chest: Effort normal. No respiratory distress.        Abdominal: Soft. She exhibits no distension and no mass. There is no abdominal tenderness. There is no rebound and no guarding.             Musculoskeletal: Normal range of motion and moves all extremeties. No tenderness.       Neurological: She is alert and oriented to person, place, and time. No cranial nerve deficit. Coordination normal.    Skin: Skin is warm. She is not diaphoretic. No erythema.    Psychiatric: She has a normal mood and affect. Her behavior is normal. Judgment and thought content normal.         Assessment/Plan:     34 y.o. female  for:    * Vaginal delivery  Routine pp care, dismiss today        Disposition: As patient meets milestones, will plan to discharge today.    Josué David MD  Obstetrics  [facility] - [facility]

## 2024-07-11 NOTE — PLAN OF CARE
Patient discharged per order. VS stable with no signs of distress. Discharge instructions reviewed with patient. Reviewed urgent maternal warning signs and instructed to go to ER or call physician if symptoms occur. Reviewed signs and symptoms of depression and anxiety and provided relevant handouts. Reviewed community resources available. Instructed on follow-up appointment with physician. Patient voiced understanding of all with use of [employer] [employer] .

## 2024-07-11 NOTE — LACTATION NOTE
07/11/24 0845   Maternal Assessment   Breast Density Bilateral:;full   Areola Bilateral:;elastic   Nipples Bilateral:;everted   Left Nipple Symptoms tender   Right Nipple Symptoms tender   Maternal Infant Feeding   Maternal Preparation breast care   Maternal Emotional State independent;relaxed   Infant Positioning cradle   Signs of Milk Transfer audible swallow;infant jaw motion present;breasts soften with feeding   Latch Assistance no   Breast Pumping   Breast Pumping Interventions other (see comments)  (pumping for relief fo engorgement as needed)   Breast Pumping double electric breast pump utilized     Mother breastfeeding independently now-baby with strong sucking and swallows noted -mother complaint of initial latch pain that resolves quickly and denies nipple soreness -used breast pump over night to soften breasts after feeding baby -ready for discharge today -has Rx for personal pump for home use -Review breastfeeding discharge information with mother -aware of need to monitor wet and dirty diapers over the next few days -referred to discharge After Visit summary for community resources -review storage guidelines for pumped milk -all questions answered and states understanding of information

## 2024-07-11 NOTE — PLAN OF CARE
VSS, breastfeeding on demand, encouraged 8 times in 24 hours, wearing supportive bra. Fundus and lochia WDL. Voiding, passing flatus, ambulating and tolerating regular diet well. Bonding well with baby. Pain being managed with motrin and tylenol. Stated an understanding to POC.

## 2024-07-14 ENCOUNTER — TELEPHONE (OUTPATIENT)
Dept: LACTATION | Facility: CLINIC | Age: 34
End: 2024-07-14
Payer: MEDICAID

## 2024-08-16 ENCOUNTER — POSTPARTUM VISIT (OUTPATIENT)
Dept: OBSTETRICS AND GYNECOLOGY | Facility: CLINIC | Age: 34
End: 2024-08-16
Payer: MEDICAID

## 2024-08-16 VITALS — WEIGHT: 112.44 LBS | DIASTOLIC BLOOD PRESSURE: 70 MMHG | SYSTOLIC BLOOD PRESSURE: 112 MMHG | BODY MASS INDEX: 19.3 KG/M2

## 2024-08-16 PROCEDURE — 99999 PR PBB SHADOW E&M-EST. PATIENT-LVL II: CPT | Mod: PBBFAC,,, | Performed by: OBSTETRICS & GYNECOLOGY

## 2024-08-16 PROCEDURE — 99212 OFFICE O/P EST SF 10 MIN: CPT | Mod: PBBFAC | Performed by: OBSTETRICS & GYNECOLOGY

## 2024-08-16 NOTE — PROGRESS NOTES
34 y.o. female for postpartum visit.  Patient has no complaints.  Her delivery records were reviewed.  She is bottle feeding infant.  PT had uncomplicated .  PT denies baby blues or depression.  Pt has good support with infant.  Pt declines any contraception, and pt aware of possibility of pregnancy.    Vitals:    24 1334   BP: 112/70   Weight: 51 kg (112 lb 7 oz)         Exam  General - well appearing, no apparent distress  Abdomen - soft, non tender, non distended incision:  none.  Pelvic - normal external genitalia, any lacerations well healed               uterus non tender, appropriately sized  Extremeties - no edema    Female chaperone, Elieser Sheridan,  present for entire exam      Assessment:  Encounter Diagnosis   Name Primary?    Postpartum examination following vaginal delivery Yes        Pap collected  Stressed need to continue PNV or MVi qd if not using contraception